# Patient Record
Sex: FEMALE | Race: WHITE | NOT HISPANIC OR LATINO
[De-identification: names, ages, dates, MRNs, and addresses within clinical notes are randomized per-mention and may not be internally consistent; named-entity substitution may affect disease eponyms.]

---

## 2017-04-27 ENCOUNTER — TRANSCRIPTION ENCOUNTER (OUTPATIENT)
Age: 27
End: 2017-04-27

## 2017-12-22 ENCOUNTER — OUTPATIENT (OUTPATIENT)
Dept: OUTPATIENT SERVICES | Facility: HOSPITAL | Age: 27
LOS: 1 days | End: 2017-12-22

## 2017-12-22 DIAGNOSIS — Z3A.00 WEEKS OF GESTATION OF PREGNANCY NOT SPECIFIED: ICD-10-CM

## 2017-12-22 DIAGNOSIS — O26.899 OTHER SPECIFIED PREGNANCY RELATED CONDITIONS, UNSPECIFIED TRIMESTER: ICD-10-CM

## 2018-03-30 ENCOUNTER — OUTPATIENT (OUTPATIENT)
Dept: OUTPATIENT SERVICES | Facility: HOSPITAL | Age: 28
LOS: 1 days | End: 2018-03-30

## 2018-03-30 ENCOUNTER — EMERGENCY (EMERGENCY)
Facility: HOSPITAL | Age: 28
LOS: 1 days | Discharge: STILL ADMITTED OR ADMINISTRATIVE | End: 2018-03-30
Attending: EMERGENCY MEDICINE | Admitting: EMERGENCY MEDICINE

## 2018-03-30 VITALS
DIASTOLIC BLOOD PRESSURE: 71 MMHG | OXYGEN SATURATION: 100 % | TEMPERATURE: 99 F | SYSTOLIC BLOOD PRESSURE: 105 MMHG | HEART RATE: 83 BPM | RESPIRATION RATE: 16 BRPM

## 2018-03-30 VITALS
DIASTOLIC BLOOD PRESSURE: 56 MMHG | SYSTOLIC BLOOD PRESSURE: 102 MMHG | TEMPERATURE: 98 F | RESPIRATION RATE: 17 BRPM | OXYGEN SATURATION: 100 % | HEART RATE: 80 BPM

## 2018-03-30 DIAGNOSIS — O26.899 OTHER SPECIFIED PREGNANCY RELATED CONDITIONS, UNSPECIFIED TRIMESTER: ICD-10-CM

## 2018-03-30 DIAGNOSIS — Z3A.00 WEEKS OF GESTATION OF PREGNANCY NOT SPECIFIED: ICD-10-CM

## 2018-03-30 LAB
ALBUMIN SERPL ELPH-MCNC: 3.3 G/DL — SIGNIFICANT CHANGE UP (ref 3.3–5)
ALP SERPL-CCNC: 84 U/L — SIGNIFICANT CHANGE UP (ref 40–120)
ALT FLD-CCNC: 16 U/L — SIGNIFICANT CHANGE UP (ref 4–33)
APPEARANCE UR: SIGNIFICANT CHANGE UP
AST SERPL-CCNC: 22 U/L — SIGNIFICANT CHANGE UP (ref 4–32)
B PERT DNA SPEC QL NAA+PROBE: SIGNIFICANT CHANGE UP
BACTERIA # UR AUTO: SIGNIFICANT CHANGE UP
BASE EXCESS BLDV CALC-SCNC: 0.7 MMOL/L — SIGNIFICANT CHANGE UP
BASOPHILS # BLD AUTO: 0.01 K/UL — SIGNIFICANT CHANGE UP (ref 0–0.2)
BASOPHILS NFR BLD AUTO: 0.1 % — SIGNIFICANT CHANGE UP (ref 0–2)
BILIRUB SERPL-MCNC: 0.3 MG/DL — SIGNIFICANT CHANGE UP (ref 0.2–1.2)
BILIRUB UR-MCNC: NEGATIVE — SIGNIFICANT CHANGE UP
BLOOD GAS VENOUS - CREATININE: 0.69 MG/DL — SIGNIFICANT CHANGE UP (ref 0.5–1.3)
BLOOD UR QL VISUAL: NEGATIVE — SIGNIFICANT CHANGE UP
BUN SERPL-MCNC: 7 MG/DL — SIGNIFICANT CHANGE UP (ref 7–23)
C PNEUM DNA SPEC QL NAA+PROBE: NOT DETECTED — SIGNIFICANT CHANGE UP
CALCIUM SERPL-MCNC: 8.3 MG/DL — LOW (ref 8.4–10.5)
CHLORIDE BLDV-SCNC: 106 MMOL/L — SIGNIFICANT CHANGE UP (ref 96–108)
CHLORIDE SERPL-SCNC: 103 MMOL/L — SIGNIFICANT CHANGE UP (ref 98–107)
CO2 SERPL-SCNC: 24 MMOL/L — SIGNIFICANT CHANGE UP (ref 22–31)
COLOR SPEC: YELLOW — SIGNIFICANT CHANGE UP
CREAT SERPL-MCNC: 0.67 MG/DL — SIGNIFICANT CHANGE UP (ref 0.5–1.3)
EOSINOPHIL # BLD AUTO: 0.04 K/UL — SIGNIFICANT CHANGE UP (ref 0–0.5)
EOSINOPHIL NFR BLD AUTO: 0.4 % — SIGNIFICANT CHANGE UP (ref 0–6)
FLUAV H1 2009 PAND RNA SPEC QL NAA+PROBE: NOT DETECTED — SIGNIFICANT CHANGE UP
FLUAV H1 RNA SPEC QL NAA+PROBE: NOT DETECTED — SIGNIFICANT CHANGE UP
FLUAV H3 RNA SPEC QL NAA+PROBE: NOT DETECTED — SIGNIFICANT CHANGE UP
FLUAV SUBTYP SPEC NAA+PROBE: SIGNIFICANT CHANGE UP
FLUBV RNA SPEC QL NAA+PROBE: NOT DETECTED — SIGNIFICANT CHANGE UP
GAS PNL BLDV: 136 MMOL/L — SIGNIFICANT CHANGE UP (ref 136–146)
GLUCOSE BLDV-MCNC: 85 — SIGNIFICANT CHANGE UP (ref 70–99)
GLUCOSE SERPL-MCNC: 87 MG/DL — SIGNIFICANT CHANGE UP (ref 70–99)
GLUCOSE UR-MCNC: NEGATIVE — SIGNIFICANT CHANGE UP
HADV DNA SPEC QL NAA+PROBE: NOT DETECTED — SIGNIFICANT CHANGE UP
HCO3 BLDV-SCNC: 24 MMOL/L — SIGNIFICANT CHANGE UP (ref 20–27)
HCOV 229E RNA SPEC QL NAA+PROBE: NOT DETECTED — SIGNIFICANT CHANGE UP
HCOV HKU1 RNA SPEC QL NAA+PROBE: NOT DETECTED — SIGNIFICANT CHANGE UP
HCOV NL63 RNA SPEC QL NAA+PROBE: NOT DETECTED — SIGNIFICANT CHANGE UP
HCOV OC43 RNA SPEC QL NAA+PROBE: NOT DETECTED — SIGNIFICANT CHANGE UP
HCT VFR BLD CALC: 33 % — LOW (ref 34.5–45)
HCT VFR BLDV CALC: 33.3 % — LOW (ref 34.5–45)
HGB BLD-MCNC: 10.6 G/DL — LOW (ref 11.5–15.5)
HGB BLDV-MCNC: 10.8 G/DL — LOW (ref 11.5–15.5)
HMPV RNA SPEC QL NAA+PROBE: NOT DETECTED — SIGNIFICANT CHANGE UP
HPIV1 RNA SPEC QL NAA+PROBE: NOT DETECTED — SIGNIFICANT CHANGE UP
HPIV2 RNA SPEC QL NAA+PROBE: NOT DETECTED — SIGNIFICANT CHANGE UP
HPIV3 RNA SPEC QL NAA+PROBE: NOT DETECTED — SIGNIFICANT CHANGE UP
HPIV4 RNA SPEC QL NAA+PROBE: NOT DETECTED — SIGNIFICANT CHANGE UP
IMM GRANULOCYTES # BLD AUTO: 0.04 # — SIGNIFICANT CHANGE UP
IMM GRANULOCYTES NFR BLD AUTO: 0.4 % — SIGNIFICANT CHANGE UP (ref 0–1.5)
KETONES UR-MCNC: NEGATIVE — SIGNIFICANT CHANGE UP
LACTATE BLDV-MCNC: 0.9 MMOL/L — SIGNIFICANT CHANGE UP (ref 0.5–2)
LEUKOCYTE ESTERASE UR-ACNC: HIGH
LIDOCAIN IGE QN: 30.6 U/L — SIGNIFICANT CHANGE UP (ref 7–60)
LYMPHOCYTES # BLD AUTO: 0.89 K/UL — LOW (ref 1–3.3)
LYMPHOCYTES # BLD AUTO: 9.6 % — LOW (ref 13–44)
M PNEUMO DNA SPEC QL NAA+PROBE: NOT DETECTED — SIGNIFICANT CHANGE UP
MCHC RBC-ENTMCNC: 29.4 PG — SIGNIFICANT CHANGE UP (ref 27–34)
MCHC RBC-ENTMCNC: 32.1 % — SIGNIFICANT CHANGE UP (ref 32–36)
MCV RBC AUTO: 91.4 FL — SIGNIFICANT CHANGE UP (ref 80–100)
MONOCYTES # BLD AUTO: 0.65 K/UL — SIGNIFICANT CHANGE UP (ref 0–0.9)
MONOCYTES NFR BLD AUTO: 7 % — SIGNIFICANT CHANGE UP (ref 2–14)
NEUTROPHILS # BLD AUTO: 7.61 K/UL — HIGH (ref 1.8–7.4)
NEUTROPHILS NFR BLD AUTO: 82.5 % — HIGH (ref 43–77)
NITRITE UR-MCNC: NEGATIVE — SIGNIFICANT CHANGE UP
NRBC # FLD: 0 — SIGNIFICANT CHANGE UP
PCO2 BLDV: 41 MMHG — SIGNIFICANT CHANGE UP (ref 41–51)
PH BLDV: 7.4 PH — SIGNIFICANT CHANGE UP (ref 7.32–7.43)
PH UR: 6.5 — SIGNIFICANT CHANGE UP (ref 4.6–8)
PLATELET # BLD AUTO: 179 K/UL — SIGNIFICANT CHANGE UP (ref 150–400)
PMV BLD: 11.2 FL — SIGNIFICANT CHANGE UP (ref 7–13)
PO2 BLDV: 28 MMHG — LOW (ref 35–40)
POTASSIUM BLDV-SCNC: 3.5 MMOL/L — SIGNIFICANT CHANGE UP (ref 3.4–4.5)
POTASSIUM SERPL-MCNC: 3.5 MMOL/L — SIGNIFICANT CHANGE UP (ref 3.5–5.3)
POTASSIUM SERPL-SCNC: 3.5 MMOL/L — SIGNIFICANT CHANGE UP (ref 3.5–5.3)
PROT SERPL-MCNC: 6.4 G/DL — SIGNIFICANT CHANGE UP (ref 6–8.3)
PROT UR-MCNC: 20 MG/DL — SIGNIFICANT CHANGE UP
RBC # BLD: 3.61 M/UL — LOW (ref 3.8–5.2)
RBC # FLD: 11.9 % — SIGNIFICANT CHANGE UP (ref 10.3–14.5)
RBC CASTS # UR COMP ASSIST: SIGNIFICANT CHANGE UP (ref 0–?)
RSV RNA SPEC QL NAA+PROBE: NOT DETECTED — SIGNIFICANT CHANGE UP
RV+EV RNA SPEC QL NAA+PROBE: NOT DETECTED — SIGNIFICANT CHANGE UP
SAO2 % BLDV: 46.6 % — LOW (ref 60–85)
SODIUM SERPL-SCNC: 138 MMOL/L — SIGNIFICANT CHANGE UP (ref 135–145)
SP GR SPEC: 1.01 — SIGNIFICANT CHANGE UP (ref 1–1.04)
SQUAMOUS # UR AUTO: SIGNIFICANT CHANGE UP
UROBILINOGEN FLD QL: NORMAL MG/DL — SIGNIFICANT CHANGE UP
WBC # BLD: 9.24 K/UL — SIGNIFICANT CHANGE UP (ref 3.8–10.5)
WBC # FLD AUTO: 9.24 K/UL — SIGNIFICANT CHANGE UP (ref 3.8–10.5)
WBC UR QL: HIGH (ref 0–?)

## 2018-03-30 RX ORDER — SODIUM CHLORIDE 9 MG/ML
1000 INJECTION INTRAMUSCULAR; INTRAVENOUS; SUBCUTANEOUS ONCE
Qty: 0 | Refills: 0 | Status: COMPLETED | OUTPATIENT
Start: 2018-03-30 | End: 2018-03-30

## 2018-03-30 RX ORDER — CEPHALEXIN 500 MG
1 CAPSULE ORAL
Qty: 28 | Refills: 0 | OUTPATIENT
Start: 2018-03-30 | End: 2018-04-05

## 2018-03-30 RX ADMIN — SODIUM CHLORIDE 1000 MILLILITER(S): 9 INJECTION INTRAMUSCULAR; INTRAVENOUS; SUBCUTANEOUS at 08:33

## 2018-03-30 NOTE — ED ADULT NURSE NOTE - OBJECTIVE STATEMENT
Patient is 33 weeks pregnant , c/o fevers, general malaise, morning nausea, and episode of vomiting today. States recently treated for URI last week given azithromycin took 4/5 doses states vomited the last dose. Currently appearing in NAD. Denies vaginal bleeding states has some cramping.

## 2018-03-30 NOTE — ED PROVIDER NOTE - OBJECTIVE STATEMENT
28yo F with no PMH presenting with sudden onset chills with vomiting episode waking her from sleep 2 hours ago. When she awoke she felt like she had She checked her temperature, and it was 100degF orally, so she took Tylenol 500mg x 1. She had similar symptoms 2 days ago with no recurrence until last night; 2 days ago she had vomiting x 4 episodes with temperature 103degF. Her gynecologist told her that if her fevers persisted, she would need to go to the ED. Also of note, 1.5 weeks ago she was treated for URI with azithromycin x 4 days (she has penicillin allergy) with subsequent resolution of sore throat and ear pain. 26yo F with no PMH presenting with sudden onset chills with vomiting episode waking her from sleep 2 hours ago. When she awoke she felt like she had She checked her temperature, and it was 100degF orally, so she took Tylenol 500mg x 1. She had similar symptoms 2 days ago with no recurrence until last night; 2 days ago she had vomiting x 4 episodes with temperature 103degF. She was diagnosed with UTI by her PMD at this time 2 days ago. Her gynecologist told her that if her fevers persisted, she would need to go to the ED because he was out of options for abx. Also of note, 1.5 weeks ago she was treated for URI with azithromycin x 4 days (she has penicillin allergy) with subsequent resolution of sore throat and ear pain. 26yo F with no PMH presenting with sudden onset chills with vomiting episode waking her from sleep 2 hours ago. When she awoke she felt like she had She checked her temperature, and it was 100degF orally, so she took Tylenol 500mg x 1. She had similar symptoms 2 days ago with no recurrence until last night; 2 days ago she had vomiting x 4 episodes with temperature 103degF. She was diagnosed with UTI by her PMD at this time 2 days ago. Her gynecologist told her that if her fevers persisted, she would need to go to the ED because he was out of options for abx. Also of note, 1.5 weeks ago she was treated for URI with azithromycin x 4 days (she has penicillin allergy) with subsequent resolution of sore throat and ear pain. Pt reports having occasional lower abdominal cramping, beginning 2 weeks ago.

## 2018-03-30 NOTE — ED ADULT TRIAGE NOTE - CHIEF COMPLAINT QUOTE
pt. 33 week pregnant c/o subjective fevers and chills. Pt. states she took 500 mg of tylenol for her fever. Pt. denies any abdominal pain , states her baby is actively moving. L&D triage aware of pt. , needs to be medically evaluated.

## 2018-03-30 NOTE — ED PROVIDER NOTE - PROGRESS NOTE DETAILS
Discussed case with Dr. Deysi Duarte; she sent U/A 2 days ago but does not have available. She is interested in Pt well appearing, called [phone] NP at triage as pt with lower abd cramps, no symptoms except dysuria, and cramps, no cough, n/v/d, last fever was 103 2 days ago.  Highest today was 99.  Recent abx 1 week ago Azithro for URI, but no cough, sore throat now. Back pain throughout 3rd trimester.  Spoke with NP and agree for baby evaluation on L+D and they will followup with labs

## 2018-03-30 NOTE — ED PROVIDER NOTE - MEDICAL DECISION MAKING DETAILS
26yo F with no PMH presenting with sudden onset chills with vomiting episode waking her from sleep 2 hours ago, will check basic labs and fetal heartrate; suspect viral gastroenteritis at this time pending labs. 26yo F with no PMH presenting with sudden onset chills with vomiting episode waking her from sleep 2 hours ago, will check basic labs and fetal heartrate; suspect UTI vs gastroenteritis at this time pending labs.

## 2018-03-31 LAB
BACTERIA UR CULT: SIGNIFICANT CHANGE UP
SPECIMEN SOURCE: SIGNIFICANT CHANGE UP

## 2018-05-15 ENCOUNTER — INPATIENT (INPATIENT)
Facility: HOSPITAL | Age: 28
LOS: 1 days | Discharge: HOME | End: 2018-05-17
Attending: SPECIALIST | Admitting: SPECIALIST

## 2018-05-15 ENCOUNTER — TRANSCRIPTION ENCOUNTER (OUTPATIENT)
Age: 28
End: 2018-05-15

## 2018-05-15 VITALS — WEIGHT: 130.07 LBS | HEIGHT: 66 IN

## 2018-05-15 DIAGNOSIS — O26.899 OTHER SPECIFIED PREGNANCY RELATED CONDITIONS, UNSPECIFIED TRIMESTER: ICD-10-CM

## 2018-05-15 DIAGNOSIS — Z3A.00 WEEKS OF GESTATION OF PREGNANCY NOT SPECIFIED: ICD-10-CM

## 2018-05-15 LAB
BASOPHILS # BLD AUTO: 0.03 K/UL — SIGNIFICANT CHANGE UP (ref 0–0.2)
BASOPHILS NFR BLD AUTO: 0.2 % — SIGNIFICANT CHANGE UP (ref 0–2)
BLD GP AB SCN SERPL QL: NEGATIVE — SIGNIFICANT CHANGE UP
EOSINOPHIL # BLD AUTO: 0.01 K/UL — SIGNIFICANT CHANGE UP (ref 0–0.5)
EOSINOPHIL NFR BLD AUTO: 0.1 % — SIGNIFICANT CHANGE UP (ref 0–6)
HCT VFR BLD CALC: 35.7 % — SIGNIFICANT CHANGE UP (ref 34.5–45)
HGB BLD-MCNC: 11.8 G/DL — SIGNIFICANT CHANGE UP (ref 11.5–15.5)
IMM GRANULOCYTES # BLD AUTO: 0.08 # — SIGNIFICANT CHANGE UP
IMM GRANULOCYTES NFR BLD AUTO: 0.5 % — SIGNIFICANT CHANGE UP (ref 0–1.5)
LYMPHOCYTES # BLD AUTO: 1.72 K/UL — SIGNIFICANT CHANGE UP (ref 1–3.3)
LYMPHOCYTES # BLD AUTO: 10.8 % — LOW (ref 13–44)
MCHC RBC-ENTMCNC: 29.8 PG — SIGNIFICANT CHANGE UP (ref 27–34)
MCHC RBC-ENTMCNC: 33.1 % — SIGNIFICANT CHANGE UP (ref 32–36)
MCV RBC AUTO: 90.2 FL — SIGNIFICANT CHANGE UP (ref 80–100)
MONOCYTES # BLD AUTO: 0.81 K/UL — SIGNIFICANT CHANGE UP (ref 0–0.9)
MONOCYTES NFR BLD AUTO: 5.1 % — SIGNIFICANT CHANGE UP (ref 2–14)
NEUTROPHILS # BLD AUTO: 13.32 K/UL — HIGH (ref 1.8–7.4)
NEUTROPHILS NFR BLD AUTO: 83.3 % — HIGH (ref 43–77)
NRBC # FLD: 0 — SIGNIFICANT CHANGE UP
PLATELET # BLD AUTO: 152 K/UL — SIGNIFICANT CHANGE UP (ref 150–400)
PMV BLD: 12.5 FL — SIGNIFICANT CHANGE UP (ref 7–13)
RBC # BLD: 3.96 M/UL — SIGNIFICANT CHANGE UP (ref 3.8–5.2)
RBC # FLD: 13.2 % — SIGNIFICANT CHANGE UP (ref 10.3–14.5)
RH IG SCN BLD-IMP: POSITIVE — SIGNIFICANT CHANGE UP
RH IG SCN BLD-IMP: POSITIVE — SIGNIFICANT CHANGE UP
WBC # BLD: 15.97 K/UL — HIGH (ref 3.8–10.5)
WBC # FLD AUTO: 15.97 K/UL — HIGH (ref 3.8–10.5)

## 2018-05-15 RX ORDER — ACETAMINOPHEN 500 MG
975 TABLET ORAL EVERY 6 HOURS
Qty: 0 | Refills: 0 | Status: DISCONTINUED | OUTPATIENT
Start: 2018-05-15 | End: 2018-05-17

## 2018-05-15 RX ORDER — DOCUSATE SODIUM 100 MG
100 CAPSULE ORAL
Qty: 0 | Refills: 0 | Status: DISCONTINUED | OUTPATIENT
Start: 2018-05-15 | End: 2018-05-17

## 2018-05-15 RX ORDER — IBUPROFEN 200 MG
600 TABLET ORAL EVERY 6 HOURS
Qty: 0 | Refills: 0 | Status: DISCONTINUED | OUTPATIENT
Start: 2018-05-15 | End: 2018-05-17

## 2018-05-15 RX ORDER — OXYCODONE HYDROCHLORIDE 5 MG/1
5 TABLET ORAL
Qty: 0 | Refills: 0 | Status: DISCONTINUED | OUTPATIENT
Start: 2018-05-15 | End: 2018-05-17

## 2018-05-15 RX ORDER — OXYTOCIN 10 UNIT/ML
333.33 VIAL (ML) INJECTION
Qty: 20 | Refills: 0 | Status: COMPLETED | OUTPATIENT
Start: 2018-05-15

## 2018-05-15 RX ORDER — OXYTOCIN 10 UNIT/ML
41.67 VIAL (ML) INJECTION
Qty: 20 | Refills: 0 | Status: DISCONTINUED | OUTPATIENT
Start: 2018-05-15 | End: 2018-05-15

## 2018-05-15 RX ORDER — HYDROCORTISONE 1 %
1 OINTMENT (GRAM) TOPICAL EVERY 4 HOURS
Qty: 0 | Refills: 0 | Status: DISCONTINUED | OUTPATIENT
Start: 2018-05-15 | End: 2018-05-16

## 2018-05-15 RX ORDER — CITRIC ACID/SODIUM CITRATE 300-500 MG
15 SOLUTION, ORAL ORAL EVERY 4 HOURS
Qty: 0 | Refills: 0 | Status: DISCONTINUED | OUTPATIENT
Start: 2018-05-15 | End: 2018-05-15

## 2018-05-15 RX ORDER — AER TRAVELER 0.5 G/1
1 SOLUTION RECTAL; TOPICAL EVERY 4 HOURS
Qty: 0 | Refills: 0 | Status: DISCONTINUED | OUTPATIENT
Start: 2018-05-15 | End: 2018-05-17

## 2018-05-15 RX ORDER — TETANUS TOXOID, REDUCED DIPHTHERIA TOXOID AND ACELLULAR PERTUSSIS VACCINE, ADSORBED 5; 2.5; 8; 8; 2.5 [IU]/.5ML; [IU]/.5ML; UG/.5ML; UG/.5ML; UG/.5ML
0.5 SUSPENSION INTRAMUSCULAR ONCE
Qty: 0 | Refills: 0 | Status: DISCONTINUED | OUTPATIENT
Start: 2018-05-15 | End: 2018-05-17

## 2018-05-15 RX ORDER — SIMETHICONE 80 MG/1
80 TABLET, CHEWABLE ORAL EVERY 6 HOURS
Qty: 0 | Refills: 0 | Status: DISCONTINUED | OUTPATIENT
Start: 2018-05-15 | End: 2018-05-17

## 2018-05-15 RX ORDER — PRAMOXINE HYDROCHLORIDE 150 MG/15G
1 AEROSOL, FOAM RECTAL EVERY 4 HOURS
Qty: 0 | Refills: 0 | Status: DISCONTINUED | OUTPATIENT
Start: 2018-05-15 | End: 2018-05-15

## 2018-05-15 RX ORDER — IBUPROFEN 200 MG
600 TABLET ORAL EVERY 6 HOURS
Qty: 0 | Refills: 0 | Status: DISCONTINUED | OUTPATIENT
Start: 2018-05-15 | End: 2018-05-15

## 2018-05-15 RX ORDER — GLYCERIN ADULT
1 SUPPOSITORY, RECTAL RECTAL AT BEDTIME
Qty: 0 | Refills: 0 | Status: DISCONTINUED | OUTPATIENT
Start: 2018-05-15 | End: 2018-05-17

## 2018-05-15 RX ORDER — OXYCODONE AND ACETAMINOPHEN 5; 325 MG/1; MG/1
2 TABLET ORAL EVERY 6 HOURS
Qty: 0 | Refills: 0 | Status: DISCONTINUED | OUTPATIENT
Start: 2018-05-15 | End: 2018-05-15

## 2018-05-15 RX ORDER — LANOLIN
1 OINTMENT (GRAM) TOPICAL EVERY 6 HOURS
Qty: 0 | Refills: 0 | Status: DISCONTINUED | OUTPATIENT
Start: 2018-05-15 | End: 2018-05-17

## 2018-05-15 RX ORDER — SODIUM CHLORIDE 9 MG/ML
1000 INJECTION, SOLUTION INTRAVENOUS ONCE
Qty: 0 | Refills: 0 | Status: COMPLETED | OUTPATIENT
Start: 2018-05-15 | End: 2018-05-15

## 2018-05-15 RX ORDER — IBUPROFEN 200 MG
600 TABLET ORAL EVERY 6 HOURS
Qty: 0 | Refills: 0 | Status: COMPLETED | OUTPATIENT
Start: 2018-05-15 | End: 2019-04-13

## 2018-05-15 RX ORDER — DIBUCAINE 1 %
1 OINTMENT (GRAM) RECTAL EVERY 4 HOURS
Qty: 0 | Refills: 0 | Status: DISCONTINUED | OUTPATIENT
Start: 2018-05-15 | End: 2018-05-17

## 2018-05-15 RX ORDER — SODIUM CHLORIDE 9 MG/ML
3 INJECTION INTRAMUSCULAR; INTRAVENOUS; SUBCUTANEOUS EVERY 8 HOURS
Qty: 0 | Refills: 0 | Status: DISCONTINUED | OUTPATIENT
Start: 2018-05-15 | End: 2018-05-17

## 2018-05-15 RX ORDER — OXYTOCIN 10 UNIT/ML
333.33 VIAL (ML) INJECTION
Qty: 20 | Refills: 0 | Status: COMPLETED | OUTPATIENT
Start: 2018-05-15 | End: 2018-05-15

## 2018-05-15 RX ORDER — PRAMOXINE HYDROCHLORIDE 150 MG/15G
1 AEROSOL, FOAM RECTAL EVERY 4 HOURS
Qty: 0 | Refills: 0 | Status: DISCONTINUED | OUTPATIENT
Start: 2018-05-15 | End: 2018-05-16

## 2018-05-15 RX ORDER — SODIUM CHLORIDE 9 MG/ML
3 INJECTION INTRAMUSCULAR; INTRAVENOUS; SUBCUTANEOUS EVERY 8 HOURS
Qty: 0 | Refills: 0 | Status: DISCONTINUED | OUTPATIENT
Start: 2018-05-15 | End: 2018-05-15

## 2018-05-15 RX ORDER — OXYTOCIN 10 UNIT/ML
41.67 VIAL (ML) INJECTION
Qty: 20 | Refills: 0 | Status: DISCONTINUED | OUTPATIENT
Start: 2018-05-15 | End: 2018-05-16

## 2018-05-15 RX ORDER — SODIUM CHLORIDE 9 MG/ML
1000 INJECTION, SOLUTION INTRAVENOUS
Qty: 0 | Refills: 0 | Status: DISCONTINUED | OUTPATIENT
Start: 2018-05-15 | End: 2018-05-15

## 2018-05-15 RX ORDER — OXYCODONE HYDROCHLORIDE 5 MG/1
5 TABLET ORAL EVERY 4 HOURS
Qty: 0 | Refills: 0 | Status: DISCONTINUED | OUTPATIENT
Start: 2018-05-15 | End: 2018-05-17

## 2018-05-15 RX ORDER — KETOROLAC TROMETHAMINE 30 MG/ML
30 SYRINGE (ML) INJECTION ONCE
Qty: 0 | Refills: 0 | Status: DISCONTINUED | OUTPATIENT
Start: 2018-05-15 | End: 2018-05-16

## 2018-05-15 RX ORDER — MAGNESIUM HYDROXIDE 400 MG/1
30 TABLET, CHEWABLE ORAL
Qty: 0 | Refills: 0 | Status: DISCONTINUED | OUTPATIENT
Start: 2018-05-15 | End: 2018-05-17

## 2018-05-15 RX ORDER — HYDROCORTISONE 1 %
1 OINTMENT (GRAM) TOPICAL EVERY 4 HOURS
Qty: 0 | Refills: 0 | Status: DISCONTINUED | OUTPATIENT
Start: 2018-05-15 | End: 2018-05-15

## 2018-05-15 RX ORDER — DIPHENHYDRAMINE HCL 50 MG
25 CAPSULE ORAL EVERY 6 HOURS
Qty: 0 | Refills: 0 | Status: DISCONTINUED | OUTPATIENT
Start: 2018-05-15 | End: 2018-05-17

## 2018-05-15 RX ORDER — ACETAMINOPHEN 500 MG
650 TABLET ORAL EVERY 6 HOURS
Qty: 0 | Refills: 0 | Status: DISCONTINUED | OUTPATIENT
Start: 2018-05-15 | End: 2018-05-15

## 2018-05-15 RX ORDER — ACETAMINOPHEN 500 MG
975 TABLET ORAL EVERY 6 HOURS
Qty: 0 | Refills: 0 | Status: COMPLETED | OUTPATIENT
Start: 2018-05-15 | End: 2019-04-13

## 2018-05-15 RX ORDER — DIBUCAINE 1 %
1 OINTMENT (GRAM) RECTAL EVERY 4 HOURS
Qty: 0 | Refills: 0 | Status: DISCONTINUED | OUTPATIENT
Start: 2018-05-15 | End: 2018-05-15

## 2018-05-15 RX ORDER — AER TRAVELER 0.5 G/1
1 SOLUTION RECTAL; TOPICAL EVERY 4 HOURS
Qty: 0 | Refills: 0 | Status: DISCONTINUED | OUTPATIENT
Start: 2018-05-15 | End: 2018-05-15

## 2018-05-15 RX ADMIN — SODIUM CHLORIDE 3 MILLILITER(S): 9 INJECTION INTRAMUSCULAR; INTRAVENOUS; SUBCUTANEOUS at 22:02

## 2018-05-15 RX ADMIN — SODIUM CHLORIDE 250 MILLILITER(S): 9 INJECTION, SOLUTION INTRAVENOUS at 13:03

## 2018-05-15 RX ADMIN — Medication 600 MILLIGRAM(S): at 22:00

## 2018-05-15 RX ADMIN — SODIUM CHLORIDE 2000 MILLILITER(S): 9 INJECTION, SOLUTION INTRAVENOUS at 12:10

## 2018-05-15 RX ADMIN — Medication 125 MILLIUNIT(S)/MIN: at 15:50

## 2018-05-15 RX ADMIN — Medication 999.99 MILLIUNIT(S)/MIN: at 15:50

## 2018-05-15 RX ADMIN — Medication 600 MILLIGRAM(S): at 21:30

## 2018-05-15 NOTE — DISCHARGE NOTE OB - MEDICATION SUMMARY - MEDICATIONS TO STOP TAKING
I will STOP taking the medications listed below when I get home from the hospital:    cephalexin 500 mg oral tablet  -- 1 tab(s) by mouth every 6 hours   -- Finish all this medication unless otherwise directed by prescriber.

## 2018-05-15 NOTE — DISCHARGE NOTE OB - MATERIALS PROVIDED
Discharge Medication Information for Patients and Families Pocket Guide/Breastfeeding Mother’s Support Group Information/Guide to Postpartum Care/[facility] Hearing Screen Program/Back To Sleep Handout/Breastfeeding Guide and Packet/Birth Certificate Instructions/  Immunization Record/Breastfeeding Log/Shaken Baby Prevention Handout/[facility] [place] Screening Program

## 2018-05-15 NOTE — DISCHARGE NOTE OB - CARE PROVIDER_API CALL
Manuel Christy (MD), Obstetrics and Gynecology  [address] [facility]  [address] [place] [address]  Phone: [phone]  Fax: [phone]

## 2018-05-15 NOTE — DISCHARGE NOTE OB - PATIENT PORTAL LINK FT
You can access the [facility] Portal, offered by [facility], by registering with the following website: [email or web address]

## 2018-05-16 LAB — T PALLIDUM AB TITR SER: NEGATIVE — SIGNIFICANT CHANGE UP

## 2018-05-16 RX ORDER — PRAMOXINE HYDROCHLORIDE 150 MG/15G
1 AEROSOL, FOAM RECTAL EVERY 4 HOURS
Qty: 0 | Refills: 0 | Status: DISCONTINUED | OUTPATIENT
Start: 2018-05-16 | End: 2018-05-17

## 2018-05-16 RX ORDER — HYDROCORTISONE 1 %
1 OINTMENT (GRAM) TOPICAL EVERY 4 HOURS
Qty: 0 | Refills: 0 | Status: DISCONTINUED | OUTPATIENT
Start: 2018-05-16 | End: 2018-05-17

## 2018-05-16 RX ADMIN — Medication 1 TABLET(S): at 11:30

## 2018-05-16 RX ADMIN — Medication 600 MILLIGRAM(S): at 21:00

## 2018-05-16 RX ADMIN — SODIUM CHLORIDE 3 MILLILITER(S): 9 INJECTION INTRAMUSCULAR; INTRAVENOUS; SUBCUTANEOUS at 06:45

## 2018-05-16 RX ADMIN — Medication 975 MILLIGRAM(S): at 03:00

## 2018-05-16 RX ADMIN — Medication 975 MILLIGRAM(S): at 03:30

## 2018-05-16 RX ADMIN — Medication 975 MILLIGRAM(S): at 12:30

## 2018-05-16 RX ADMIN — Medication 975 MILLIGRAM(S): at 11:30

## 2018-05-16 RX ADMIN — SODIUM CHLORIDE 3 MILLILITER(S): 9 INJECTION INTRAMUSCULAR; INTRAVENOUS; SUBCUTANEOUS at 14:12

## 2018-05-16 RX ADMIN — Medication 600 MILLIGRAM(S): at 22:00

## 2018-05-16 NOTE — PROGRESS NOTE ADULT - SUBJECTIVE AND OBJECTIVE BOX
S: Patient doing well.     Pain controlled.  +OOB.  +void.    Tolerating PO.  Lochia WNL.    O: Vital Signs Last 24 Hrs  T(C): 36.6 (16 May 2018 05:48), Max: 37.7 (15 May 2018 17:28)  T(F): 97.8 (16 May 2018 05:48), Max: 99.8 (15 May 2018 17:28)  HR: 63 (16 May 2018 05:48) (61 - 75)  BP: 102/52 (16 May 2018 05:48) (97/58 - 116/61)  BP(mean): --  RR: 18 (16 May 2018 05:48) (18 - 18)  SpO2: 99% (16 May 2018 05:48) (98% - 100%)      Pt without complaints  vital signs stable  Abdomen soft  fundus firm, non tender  extremities non tender  lochia wnl    Patient doing well  Routine post partum care    Labs:                        11.8   15.97 )-----------( 152      ( 15 May 2018 12:30 )             35.7       ABO Interpretation: A (05-15 @ 12:29)    Rh Interpretation: Positive (05-15 @ 12:29)    Antibody Screen Negative      A: 27y s/p  doing well.   -Continue routine postpartum care.  -Discharge planned for tomorrow

## 2018-05-16 NOTE — PROGRESS NOTE ADULT - SUBJECTIVE AND OBJECTIVE BOX
Anesthesia Post-op Note    POD#1 S/P     Patient is doing well.  OOBAA.  Pain is tolerable.  No complaints of Headache. Pt reports able to void.  No residual anesthetic issues or complications noted.    T(C): 36.6 (18 @ 05:48), Max: 37.7 (05-15-18 @ 17:28)  HR: 63 (18 @ 05:48) (61 - 75)  BP: 102/52 (18 @ 05:48) (97/58 - 116/61)  RR: 18 (18 @ 05:48) (18 - 18)  SpO2: 99% (18 @ 05:48) (98% - 100%)

## 2018-05-17 VITALS
DIASTOLIC BLOOD PRESSURE: 57 MMHG | HEART RATE: 65 BPM | TEMPERATURE: 98 F | RESPIRATION RATE: 18 BRPM | SYSTOLIC BLOOD PRESSURE: 99 MMHG | OXYGEN SATURATION: 100 %

## 2018-05-17 RX ORDER — IBUPROFEN 200 MG
1 TABLET ORAL
Qty: 0 | Refills: 0 | DISCHARGE
Start: 2018-05-17

## 2018-05-17 RX ORDER — ACETAMINOPHEN 500 MG
3 TABLET ORAL
Qty: 0 | Refills: 0 | DISCHARGE
Start: 2018-05-17

## 2018-05-17 RX ADMIN — Medication 600 MILLIGRAM(S): at 07:45

## 2018-05-17 RX ADMIN — Medication 600 MILLIGRAM(S): at 06:51

## 2019-02-11 ENCOUNTER — RESULT REVIEW (OUTPATIENT)
Age: 29
End: 2019-02-11

## 2019-05-21 ENCOUNTER — RESULT REVIEW (OUTPATIENT)
Age: 29
End: 2019-05-21

## 2019-10-21 PROBLEM — Z00.00 ENCOUNTER FOR PREVENTIVE HEALTH EXAMINATION: Status: ACTIVE | Noted: 2019-10-21

## 2019-10-23 ENCOUNTER — APPOINTMENT (OUTPATIENT)
Dept: ANTEPARTUM | Facility: CLINIC | Age: 29
End: 2019-10-23
Payer: COMMERCIAL

## 2019-10-23 ENCOUNTER — ASOB RESULT (OUTPATIENT)
Age: 29
End: 2019-10-23

## 2019-10-23 PROCEDURE — 76811 OB US DETAILED SNGL FETUS: CPT

## 2019-10-23 PROCEDURE — 76817 TRANSVAGINAL US OBSTETRIC: CPT | Mod: 59

## 2019-10-23 PROCEDURE — 99201 OFFICE OUTPATIENT NEW 10 MINUTES: CPT | Mod: 25

## 2019-10-31 ENCOUNTER — ASOB RESULT (OUTPATIENT)
Age: 29
End: 2019-10-31

## 2019-10-31 ENCOUNTER — APPOINTMENT (OUTPATIENT)
Dept: ANTEPARTUM | Facility: CLINIC | Age: 29
End: 2019-10-31
Payer: COMMERCIAL

## 2019-10-31 PROCEDURE — 76817 TRANSVAGINAL US OBSTETRIC: CPT

## 2019-10-31 PROCEDURE — 76816 OB US FOLLOW-UP PER FETUS: CPT

## 2019-11-14 ENCOUNTER — APPOINTMENT (OUTPATIENT)
Dept: ANTEPARTUM | Facility: CLINIC | Age: 29
End: 2019-11-14

## 2020-01-27 ENCOUNTER — ASOB RESULT (OUTPATIENT)
Age: 30
End: 2020-01-27

## 2020-01-27 ENCOUNTER — APPOINTMENT (OUTPATIENT)
Dept: ANTEPARTUM | Facility: CLINIC | Age: 30
End: 2020-01-27
Payer: COMMERCIAL

## 2020-01-27 PROCEDURE — 76820 UMBILICAL ARTERY ECHO: CPT

## 2020-01-27 PROCEDURE — 76816 OB US FOLLOW-UP PER FETUS: CPT

## 2020-01-27 PROCEDURE — 76819 FETAL BIOPHYS PROFIL W/O NST: CPT

## 2020-02-03 ENCOUNTER — APPOINTMENT (OUTPATIENT)
Dept: ANTEPARTUM | Facility: CLINIC | Age: 30
End: 2020-02-03
Payer: COMMERCIAL

## 2020-02-03 ENCOUNTER — ASOB RESULT (OUTPATIENT)
Age: 30
End: 2020-02-03

## 2020-02-03 PROCEDURE — 76819 FETAL BIOPHYS PROFIL W/O NST: CPT

## 2020-02-03 PROCEDURE — 76820 UMBILICAL ARTERY ECHO: CPT

## 2020-02-10 ENCOUNTER — ASOB RESULT (OUTPATIENT)
Age: 30
End: 2020-02-10

## 2020-02-10 ENCOUNTER — OUTPATIENT (OUTPATIENT)
Dept: OUTPATIENT SERVICES | Facility: HOSPITAL | Age: 30
LOS: 1 days | End: 2020-02-10
Payer: COMMERCIAL

## 2020-02-10 ENCOUNTER — APPOINTMENT (OUTPATIENT)
Dept: ANTEPARTUM | Facility: CLINIC | Age: 30
End: 2020-02-10
Payer: COMMERCIAL

## 2020-02-10 DIAGNOSIS — O09.293 SUPERVISION OF PREGNANCY WITH OTHER POOR REPRODUCTIVE OR OBSTETRIC HISTORY, THIRD TRIMESTER: ICD-10-CM

## 2020-02-10 DIAGNOSIS — O36.5930 MATERNAL CARE FOR OTHER KNOWN OR SUSPECTED POOR FETAL GROWTH, THIRD TRIMESTER, NOT APPLICABLE OR UNSPECIFIED: ICD-10-CM

## 2020-02-10 PROCEDURE — 76820 UMBILICAL ARTERY ECHO: CPT

## 2020-02-10 PROCEDURE — 76820 UMBILICAL ARTERY ECHO: CPT | Mod: 26

## 2020-02-10 PROCEDURE — 76818 FETAL BIOPHYS PROFILE W/NST: CPT | Mod: 26

## 2020-02-10 PROCEDURE — 76818 FETAL BIOPHYS PROFILE W/NST: CPT

## 2020-02-13 ENCOUNTER — APPOINTMENT (OUTPATIENT)
Dept: ANTEPARTUM | Facility: CLINIC | Age: 30
End: 2020-02-13

## 2020-02-18 ENCOUNTER — OUTPATIENT (OUTPATIENT)
Dept: OUTPATIENT SERVICES | Facility: HOSPITAL | Age: 30
LOS: 1 days | End: 2020-02-18
Payer: COMMERCIAL

## 2020-02-18 ENCOUNTER — APPOINTMENT (OUTPATIENT)
Dept: ANTEPARTUM | Facility: CLINIC | Age: 30
End: 2020-02-18
Payer: COMMERCIAL

## 2020-02-18 ENCOUNTER — ASOB RESULT (OUTPATIENT)
Age: 30
End: 2020-02-18

## 2020-02-18 DIAGNOSIS — O36.5930 MATERNAL CARE FOR OTHER KNOWN OR SUSPECTED POOR FETAL GROWTH, THIRD TRIMESTER, NOT APPLICABLE OR UNSPECIFIED: ICD-10-CM

## 2020-02-18 DIAGNOSIS — O09.293 SUPERVISION OF PREGNANCY WITH OTHER POOR REPRODUCTIVE OR OBSTETRIC HISTORY, THIRD TRIMESTER: ICD-10-CM

## 2020-02-18 DIAGNOSIS — Z3A.37 37 WEEKS GESTATION OF PREGNANCY: ICD-10-CM

## 2020-02-18 PROCEDURE — 76816 OB US FOLLOW-UP PER FETUS: CPT

## 2020-02-18 PROCEDURE — 76820 UMBILICAL ARTERY ECHO: CPT | Mod: 26

## 2020-02-18 PROCEDURE — 76818 FETAL BIOPHYS PROFILE W/NST: CPT | Mod: 26

## 2020-02-18 PROCEDURE — 76820 UMBILICAL ARTERY ECHO: CPT

## 2020-02-18 PROCEDURE — 76818 FETAL BIOPHYS PROFILE W/NST: CPT

## 2020-02-26 DIAGNOSIS — Z01.818 ENCOUNTER FOR OTHER PREPROCEDURAL EXAMINATION: ICD-10-CM

## 2020-03-02 ENCOUNTER — APPOINTMENT (OUTPATIENT)
Dept: ANTEPARTUM | Facility: CLINIC | Age: 30
End: 2020-03-02

## 2020-03-02 ENCOUNTER — INPATIENT (INPATIENT)
Facility: HOSPITAL | Age: 30
LOS: 1 days | Discharge: HOME | End: 2020-03-04
Attending: OBSTETRICS & GYNECOLOGY | Admitting: OBSTETRICS & GYNECOLOGY
Payer: COMMERCIAL

## 2020-03-02 VITALS
DIASTOLIC BLOOD PRESSURE: 59 MMHG | TEMPERATURE: 98 F | RESPIRATION RATE: 18 BRPM | OXYGEN SATURATION: 100 % | SYSTOLIC BLOOD PRESSURE: 114 MMHG | HEART RATE: 74 BPM

## 2020-03-02 DIAGNOSIS — O26.899 OTHER SPECIFIED PREGNANCY RELATED CONDITIONS, UNSPECIFIED TRIMESTER: ICD-10-CM

## 2020-03-02 DIAGNOSIS — Z3A.00 WEEKS OF GESTATION OF PREGNANCY NOT SPECIFIED: ICD-10-CM

## 2020-03-02 DIAGNOSIS — Z34.80 ENCOUNTER FOR SUPERVISION OF OTHER NORMAL PREGNANCY, UNSPECIFIED TRIMESTER: ICD-10-CM

## 2020-03-02 LAB
BLD GP AB SCN SERPL QL: NEGATIVE — SIGNIFICANT CHANGE UP
HCT VFR BLD CALC: 41.4 % — SIGNIFICANT CHANGE UP (ref 34.5–45)
HGB BLD-MCNC: 12.7 G/DL — SIGNIFICANT CHANGE UP (ref 11.5–15.5)
MCHC RBC-ENTMCNC: 28.2 PG — SIGNIFICANT CHANGE UP (ref 27–34)
MCHC RBC-ENTMCNC: 30.7 GM/DL — LOW (ref 32–36)
MCV RBC AUTO: 92 FL — SIGNIFICANT CHANGE UP (ref 80–100)
NRBC # BLD: 0 /100 WBCS — SIGNIFICANT CHANGE UP (ref 0–0)
PLATELET # BLD AUTO: 213 K/UL — SIGNIFICANT CHANGE UP (ref 150–400)
RBC # BLD: 4.5 M/UL — SIGNIFICANT CHANGE UP (ref 3.8–5.2)
RBC # FLD: 13.1 % — SIGNIFICANT CHANGE UP (ref 10.3–14.5)
RH IG SCN BLD-IMP: POSITIVE — SIGNIFICANT CHANGE UP
RH IG SCN BLD-IMP: POSITIVE — SIGNIFICANT CHANGE UP
WBC # BLD: 14.54 K/UL — HIGH (ref 3.8–10.5)
WBC # FLD AUTO: 14.54 K/UL — HIGH (ref 3.8–10.5)

## 2020-03-02 RX ORDER — MAGNESIUM HYDROXIDE 400 MG/1
30 TABLET, CHEWABLE ORAL
Refills: 0 | Status: DISCONTINUED | OUTPATIENT
Start: 2020-03-02 | End: 2020-03-04

## 2020-03-02 RX ORDER — SIMETHICONE 80 MG/1
80 TABLET, CHEWABLE ORAL EVERY 4 HOURS
Refills: 0 | Status: DISCONTINUED | OUTPATIENT
Start: 2020-03-02 | End: 2020-03-04

## 2020-03-02 RX ORDER — KETOROLAC TROMETHAMINE 30 MG/ML
30 SYRINGE (ML) INJECTION ONCE
Refills: 0 | Status: DISCONTINUED | OUTPATIENT
Start: 2020-03-02 | End: 2020-03-02

## 2020-03-02 RX ORDER — SODIUM CHLORIDE 9 MG/ML
3 INJECTION INTRAMUSCULAR; INTRAVENOUS; SUBCUTANEOUS EVERY 8 HOURS
Refills: 0 | Status: DISCONTINUED | OUTPATIENT
Start: 2020-03-02 | End: 2020-03-03

## 2020-03-02 RX ORDER — OXYTOCIN 10 UNIT/ML
333.33 VIAL (ML) INJECTION
Qty: 20 | Refills: 0 | Status: DISCONTINUED | OUTPATIENT
Start: 2020-03-02 | End: 2020-03-04

## 2020-03-02 RX ORDER — BENZOCAINE 10 %
1 GEL (GRAM) MUCOUS MEMBRANE EVERY 6 HOURS
Refills: 0 | Status: DISCONTINUED | OUTPATIENT
Start: 2020-03-02 | End: 2020-03-04

## 2020-03-02 RX ORDER — PRAMOXINE HYDROCHLORIDE 150 MG/15G
1 AEROSOL, FOAM RECTAL EVERY 4 HOURS
Refills: 0 | Status: DISCONTINUED | OUTPATIENT
Start: 2020-03-02 | End: 2020-03-04

## 2020-03-02 RX ORDER — DIPHENHYDRAMINE HCL 50 MG
25 CAPSULE ORAL EVERY 6 HOURS
Refills: 0 | Status: DISCONTINUED | OUTPATIENT
Start: 2020-03-02 | End: 2020-03-04

## 2020-03-02 RX ORDER — HYDROCORTISONE 1 %
1 OINTMENT (GRAM) TOPICAL EVERY 6 HOURS
Refills: 0 | Status: DISCONTINUED | OUTPATIENT
Start: 2020-03-02 | End: 2020-03-04

## 2020-03-02 RX ORDER — OXYCODONE HYDROCHLORIDE 5 MG/1
5 TABLET ORAL
Refills: 0 | Status: DISCONTINUED | OUTPATIENT
Start: 2020-03-02 | End: 2020-03-04

## 2020-03-02 RX ORDER — OXYCODONE HYDROCHLORIDE 5 MG/1
5 TABLET ORAL ONCE
Refills: 0 | Status: DISCONTINUED | OUTPATIENT
Start: 2020-03-02 | End: 2020-03-04

## 2020-03-02 RX ORDER — GLYCERIN ADULT
1 SUPPOSITORY, RECTAL RECTAL AT BEDTIME
Refills: 0 | Status: DISCONTINUED | OUTPATIENT
Start: 2020-03-02 | End: 2020-03-04

## 2020-03-02 RX ORDER — DIBUCAINE 1 %
1 OINTMENT (GRAM) RECTAL EVERY 6 HOURS
Refills: 0 | Status: DISCONTINUED | OUTPATIENT
Start: 2020-03-02 | End: 2020-03-04

## 2020-03-02 RX ORDER — AER TRAVELER 0.5 G/1
1 SOLUTION RECTAL; TOPICAL EVERY 4 HOURS
Refills: 0 | Status: DISCONTINUED | OUTPATIENT
Start: 2020-03-02 | End: 2020-03-04

## 2020-03-02 RX ORDER — TETANUS TOXOID, REDUCED DIPHTHERIA TOXOID AND ACELLULAR PERTUSSIS VACCINE, ADSORBED 5; 2.5; 8; 8; 2.5 [IU]/.5ML; [IU]/.5ML; UG/.5ML; UG/.5ML; UG/.5ML
0.5 SUSPENSION INTRAMUSCULAR ONCE
Refills: 0 | Status: DISCONTINUED | OUTPATIENT
Start: 2020-03-02 | End: 2020-03-04

## 2020-03-02 RX ORDER — LANOLIN
1 OINTMENT (GRAM) TOPICAL EVERY 6 HOURS
Refills: 0 | Status: DISCONTINUED | OUTPATIENT
Start: 2020-03-02 | End: 2020-03-04

## 2020-03-02 RX ORDER — IBUPROFEN 200 MG
600 TABLET ORAL EVERY 6 HOURS
Refills: 0 | Status: COMPLETED | OUTPATIENT
Start: 2020-03-02 | End: 2021-01-29

## 2020-03-02 RX ORDER — ACETAMINOPHEN 500 MG
975 TABLET ORAL
Refills: 0 | Status: DISCONTINUED | OUTPATIENT
Start: 2020-03-02 | End: 2020-03-04

## 2020-03-02 RX ADMIN — Medication 30 MILLIGRAM(S): at 20:24

## 2020-03-02 RX ADMIN — Medication 1000 MILLIUNIT(S)/MIN: at 21:22

## 2020-03-03 LAB
HCT VFR BLD CALC: 31.1 % — LOW (ref 34.5–45)
HGB BLD-MCNC: 10.1 G/DL — LOW (ref 11.5–15.5)
T PALLIDUM AB TITR SER: NEGATIVE — SIGNIFICANT CHANGE UP

## 2020-03-03 RX ORDER — IBUPROFEN 200 MG
600 TABLET ORAL EVERY 6 HOURS
Refills: 0 | Status: DISCONTINUED | OUTPATIENT
Start: 2020-03-03 | End: 2020-03-04

## 2020-03-03 RX ADMIN — Medication 600 MILLIGRAM(S): at 09:25

## 2020-03-03 RX ADMIN — Medication 975 MILLIGRAM(S): at 00:45

## 2020-03-03 RX ADMIN — Medication 600 MILLIGRAM(S): at 02:55

## 2020-03-03 RX ADMIN — Medication 975 MILLIGRAM(S): at 17:57

## 2020-03-03 RX ADMIN — Medication 600 MILLIGRAM(S): at 21:31

## 2020-03-03 RX ADMIN — Medication 600 MILLIGRAM(S): at 16:15

## 2020-03-03 RX ADMIN — Medication 600 MILLIGRAM(S): at 15:40

## 2020-03-03 RX ADMIN — Medication 600 MILLIGRAM(S): at 09:00

## 2020-03-03 RX ADMIN — Medication 975 MILLIGRAM(S): at 18:30

## 2020-03-03 RX ADMIN — Medication 975 MILLIGRAM(S): at 11:59

## 2020-03-03 RX ADMIN — Medication 600 MILLIGRAM(S): at 22:01

## 2020-03-03 RX ADMIN — Medication 600 MILLIGRAM(S): at 03:25

## 2020-03-03 RX ADMIN — Medication 975 MILLIGRAM(S): at 12:40

## 2020-03-03 RX ADMIN — Medication 975 MILLIGRAM(S): at 01:15

## 2020-03-03 NOTE — PROGRESS NOTE ADULT - PROBLEM SELECTOR PLAN 1
- Pain well controlled, continue current pain regimen  - Increase ambulation, SCDs when not ambulating  - Continue regular diet    Phyllis Lagunas, PGY-1

## 2020-03-03 NOTE — PROGRESS NOTE ADULT - SUBJECTIVE AND OBJECTIVE BOX
Postpartum Note- PPD#1      S:Patient w/o complaints, pain is controlled.    Pt is OOB, tolerating PO, voiding. Vaginal bleeding mild to moderate.       O:  Vital Signs Last 24 Hrs  T(C): 36.8 (03 Mar 2020 06:00), Max: 37.2 (02 Mar 2020 22:30)  T(F): 98.2 (03 Mar 2020 06:00), Max: 99 (02 Mar 2020 22:30)  HR: 52 (03 Mar 2020 06:00) (52 - 76)  BP: 105/58 (03 Mar 2020 06:00) (100/61 - 120/69)  BP(mean): --  RR: 18 (03 Mar 2020 06:00) (16 - 18)  SpO2: 97% (03 Mar 2020 00:05) (97% - 100%)         LABS:    Hemoglobin: 10.1 g/dL (03-03 @ 07:37)  Hemoglobin: 12.7 g/dL (03-02 @ 21:05)      Hematocrit: 31.1 % (03-03 @ 07:37)  Hematocrit: 41.4 % (03-02 @ 21:05)

## 2020-03-03 NOTE — PROGRESS NOTE ADULT - SUBJECTIVE AND OBJECTIVE BOX
OB Progress Note:  PPD#1    S: 30yo PPD#1 s/p , delivery precipitously. Patient feels well. Pain is well controlled. She is tolerating a regular diet and passing flatus. She is voiding spontaneously, and ambulating without difficulty. Denies CP/SOB. Denies lightheadedness/dizziness. Denies N/V.    O:  Vitals:  Vital Signs Last 24 Hrs  T(C): 36.9 (03 Mar 2020 00:05), Max: 37.2 (02 Mar 2020 22:30)  T(F): 98.4 (03 Mar 2020 00:05), Max: 99 (02 Mar 2020 22:30)  HR: 76 (03 Mar 2020 00:05) (58 - 76)  BP: 100/61 (03 Mar 2020 00:05) (100/61 - 120/69)  BP(mean): --  RR: 18 (03 Mar 2020 00:05) (16 - 18)  SpO2: 97% (03 Mar 2020 00:05) (97% - 100%)    MEDICATIONS  (STANDING):  acetaminophen   Tablet .. 975 milliGRAM(s) Oral <User Schedule>  diphtheria/tetanus/pertussis (acellular) Vaccine (ADAcel) 0.5 milliLiter(s) IntraMuscular once  ibuprofen  Tablet. 600 milliGRAM(s) Oral every 6 hours  oxytocin Infusion 333.333 milliUNIT(s)/Min (1000 mL/Hr) IV Continuous <Continuous>  prenatal multivitamin 1 Tablet(s) Oral daily  sodium chloride 0.9% lock flush 3 milliLiter(s) IV Push every 8 hours      Labs:  Blood type: A Positive  Rubella IgG: RPR:                           12.7   14.54<H> >-----------< 213    (  @ 21:05 )             41.4      Physical Exam:  General: NAD  Abdomen: soft, non-tender, non-distended, fundus firm  Vaginal: Lochia wnl  Extremities: No erythema/edema

## 2020-03-03 NOTE — PROGRESS NOTE ADULT - ASSESSMENT
Per Dr. Aldana-  Abdomen: Soft, nontender, non-distended, fundus firm.  Vaginal: Lochia WNL.   Ext: Neg edema, Neg calf tenderness

## 2020-03-04 ENCOUNTER — TRANSCRIPTION ENCOUNTER (OUTPATIENT)
Age: 30
End: 2020-03-04

## 2020-03-04 VITALS
TEMPERATURE: 98 F | HEART RATE: 53 BPM | SYSTOLIC BLOOD PRESSURE: 112 MMHG | RESPIRATION RATE: 18 BRPM | DIASTOLIC BLOOD PRESSURE: 66 MMHG

## 2020-03-04 PROCEDURE — 85018 HEMOGLOBIN: CPT

## 2020-03-04 PROCEDURE — G0463: CPT

## 2020-03-04 PROCEDURE — 90707 MMR VACCINE SC: CPT

## 2020-03-04 PROCEDURE — 86901 BLOOD TYPING SEROLOGIC RH(D): CPT

## 2020-03-04 PROCEDURE — 86850 RBC ANTIBODY SCREEN: CPT

## 2020-03-04 PROCEDURE — 59025 FETAL NON-STRESS TEST: CPT

## 2020-03-04 PROCEDURE — 85027 COMPLETE CBC AUTOMATED: CPT

## 2020-03-04 PROCEDURE — 85014 HEMATOCRIT: CPT

## 2020-03-04 PROCEDURE — 59050 FETAL MONITOR W/REPORT: CPT

## 2020-03-04 PROCEDURE — 86900 BLOOD TYPING SEROLOGIC ABO: CPT

## 2020-03-04 PROCEDURE — 86780 TREPONEMA PALLIDUM: CPT

## 2020-03-04 RX ORDER — IBUPROFEN 200 MG
1 TABLET ORAL
Qty: 0 | Refills: 0 | DISCHARGE
Start: 2020-03-04

## 2020-03-04 RX ORDER — ACETAMINOPHEN 500 MG
3 TABLET ORAL
Qty: 0 | Refills: 0 | DISCHARGE
Start: 2020-03-04

## 2020-03-04 RX ADMIN — Medication 975 MILLIGRAM(S): at 12:10

## 2020-03-04 RX ADMIN — Medication 975 MILLIGRAM(S): at 06:52

## 2020-03-04 RX ADMIN — Medication 975 MILLIGRAM(S): at 00:36

## 2020-03-04 RX ADMIN — Medication 600 MILLIGRAM(S): at 10:00

## 2020-03-04 RX ADMIN — Medication 0.5 MILLILITER(S): at 09:56

## 2020-03-04 RX ADMIN — Medication 975 MILLIGRAM(S): at 00:06

## 2020-03-04 RX ADMIN — Medication 600 MILLIGRAM(S): at 09:29

## 2020-03-04 RX ADMIN — Medication 975 MILLIGRAM(S): at 12:40

## 2020-03-04 RX ADMIN — Medication 975 MILLIGRAM(S): at 06:22

## 2020-03-04 NOTE — DISCHARGE NOTE OB - MATERIALS PROVIDED
Back To Sleep Handout/Breastfeeding Log/Breastfeeding Mother’s Support Group Information/Birth Certificate Instructions/Discharge Medication Information for Patients and Families Pocket Guide/[facility] Hearing Screen Program/Breastfeeding Guide and Packet/MMR Vaccination (VIS Pub Date: 2012)/  Immunization Record/Shaken Baby Prevention Handout/Vaccinations/[facility] [place] Screening Program Birth Certificate Instructions/  Immunization Record/Back To Sleep Handout/MMR Vaccination (VIS Pub Date: 2012)/Breastfeeding Mother’s Support Group Information/[facility]  Screening Program/[facility] Hearing Screen Program/Shaken Baby Prevention Handout/Vaccinations

## 2020-03-04 NOTE — DISCHARGE NOTE OB - PATIENT PORTAL LINK FT
You can access the FollowMyHealth Patient Portal offered by [facility] by registering at the following website: [email or web address]. By joining [employer]’s FollowMyHealth portal, you will also be able to view your health information using other applications (apps) compatible with our system.

## 2020-03-04 NOTE — DISCHARGE NOTE OB - PLAN OF CARE
The patient is a 67y Male complaining of recovery return to baseline health, regular diet, pain control, follow up with Dr Aldana

## 2020-03-04 NOTE — PROGRESS NOTE ADULT - SUBJECTIVE AND OBJECTIVE BOX
Postpartum Note- PPD#2      S:Patient w/o complaints, pain is controlled.    Pt is OOB, tolerating PO, voiding. Vaginal bleeding mild to moderate.       O:  Vital Signs Last 24 Hrs  T(C): 36.8 (04 Mar 2020 06:14), Max: 36.8 (04 Mar 2020 06:14)  T(F): 98.2 (04 Mar 2020 06:14), Max: 98.2 (04 Mar 2020 06:14)  HR: 53 (04 Mar 2020 06:14) (53 - 77)  BP: 112/66 (04 Mar 2020 06:14) (94/57 - 112/66)  BP(mean): --  RR: 18 (04 Mar 2020 06:14) (18 - 18)  SpO2: --         LABS:    Hemoglobin: 10.1 g/dL (03-03 @ 07:37)  Hemoglobin: 12.7 g/dL (03-02 @ 21:05)      Hematocrit: 31.1 % (03-03 @ 07:37)  Hematocrit: 41.4 % (03-02 @ 21:05)

## 2020-03-04 NOTE — PROGRESS NOTE ADULT - PROBLEM/PLAN-1
Admitted:     09/21/2019      CHIEF COMPLAINT:  Fever.      HISTORY OF PRESENT ILLNESS:  Had been obtained mainly from his mother, who was present in ER at the time of my examination; the patient is basically nonverbal.  I do know him from prior hospitalization.  He has multiple hospitalizations in [facility] due to sepsis due to aspiration pneumonia and UTIs.      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      The patient is well known to Hospitalist Service at [facility].  He had 15 admissions this year for similar issues of fevers at home and thought to be due to recurrent aspiration pneumonia versus pneumonitis.  It seems that in 08/2019 he was admitted to the [facility] for septic shock that was thought to be due to a UTI.  He was on pressors at that time and treated with IV antibiotics and discharged home on oral antibiotics.  It seems that each time the patient is admitted to the hospital for fevers, he is started on IV antibiotics, and he improves pretty quickly in the next 24 or 48 hours.  Overall, his hospitalizations are short, 2-4 days, but he does have history of recurrent readmissions.      Last few hospitalizations were from 09/13 to 09/15/2019 for a fever with SIRS.  At that time, his chest x-ray was unremarkable.  He was readmitted the very next day on 09/16 for recurrent fever, thought to be due to recurrent sepsis due to recurrent aspiration pneumonias.  He was started on IV Zosyn in the hospital and  was seen by ID specialist.  One blood culture out of 2 was positive for Staph hominis that was felt to be contaminant.  He was discharged home yesterday, 09/20/2019 on p.o. Augmentin for 7 more days.  Apparently, he was afebrile when he went home.      His mother states that earlier today he started having some episodes when he was acting like throwing up, although he did not really vomit.  She checked his axillary temperature, and it was 100.9.  She states that he looked sick at home, and she gave him 2 Tylenol, and after that somehow he looked better, but she decided to bring him to ER for further evaluation given his history.  Upon further questioning, he does not seem to have a reported any chest pain, no shortness of breath, no headaches.  He is getting tube feedings at 12 hours per day from 7:00 a.m. to 7:00 p.m.  He has a home health care including home RN 12 hours per day and a PCA at nighttime.  He goes to day programs 4 times a week.      After reviewing his chart, it seems that goals of care were discussed multiple times with his mother in the past.  It was mentioned that maybe Palliative Care might be helpful to be involved, but it seems that his mother was not open to this approach in the past.      In ER, she was seen by Dr. Moyer.  His initial vitals in ER showed a blood pressure 111/65, heart rate 110s.  He had fever of 103.1 in ER, respiratory rate 20, oxygen saturation 100% on room air.      In ER, he had basic blood work which showed a BMP with mildly decreased sodium of 132, potassium 4.3, chloride 99, bicarbonate 27, BUN 21, creatinine 0.72.  His albumin was 2.9, total protein 8.1, total bilirubin 0.3, alkaline phosphatase 78, ALT 17, AST 17, lactic acid 2.1.  Glucose was 97.  His VBG with pH 7.46, pCO2 of 42, pO2 of 28.  His white blood cells were elevated at 12.1, hemoglobin 11.8, hematocrit 35.7, platelet count 278,000.  His UA is negative.  Blood culture is pending at this time.  He had  a chest x-ray, which shows a patchy opacity in the right mid lung, superimposed upon pulmonary vascular crowding could represent infiltrate or pulmonary edema but seems similar to prior study.      In ER, he received 1 dose of Zosyn and 1 dose of vancomycin as broad-spectrum coverage, and Hospitalist Service was called regarding the admission.      PAST MEDICAL HISTORY:   1.  Traumatic brain injury with residual aphasia and right-sided spastic hemiplegia.   2.  Recurrent aspiration pneumonia versus pneumonitis that required multiple hospitalizations to [facility].  He had 15 hospitalizations this year.   3.  Panhypopituitarism related to his TBI.   4.  Hypothyroidism.   5.  History of septic shock in 08/2019 due to urinary tract infection.   6.  History of seizure disorder.   7.  History of upper extremity DVT.   8.  Gastroesophageal reflux disease.   9.  History of necrotizing pancreatitis.      PAST SURGICAL HISTORY:  Past Surgical History:   Procedure Laterality Date     ENDOSCOPIC ULTRASOUND UPPER GASTROINTESTINAL TRACT (GI) N/A 1/30/2017    Procedure: ENDOSCOPIC ULTRASOUND, ESOPHAGOSCOPY / UPPER GASTROINTESTINAL TRACT (GI);  Surgeon: Jus Montana MD;  Location: [facility] OR     ENDOSCOPIC ULTRASOUND, ESOPHAGOSCOPY, GASTROSCOPY, DUODENOSCOPY (EGD), NECROSECTOMY N/A 2/7/2017    Procedure: ENDOSCOPIC ULTRASOUND, ESOPHAGOSCOPY, GASTROSCOPY, DUODENOSCOPY (EGD), NECROSECTOMY;  Surgeon: Jack Marcus MD;  Location: [facility] OR     ESOPHAGOSCOPY, GASTROSCOPY, DUODENOSCOPY (EGD), COMBINED  3/13/2014    Procedure: COMBINED ESOPHAGOSCOPY, GASTROSCOPY, DUODENOSCOPY (EGD), BIOPSY SINGLE OR MULTIPLE;  gastroscopy;  Surgeon: Digna Rhodes MD;  Location: [facility]     ESOPHAGOSCOPY, GASTROSCOPY, DUODENOSCOPY (EGD), COMBINED N/A 12/6/2016    Procedure: COMBINED ESOPHAGOSCOPY, GASTROSCOPY, DUODENOSCOPY (EGD);  Surgeon: Digna Rhodes MD;  Location: [facility]     ESOPHAGOSCOPY, GASTROSCOPY, DUODENOSCOPY  (EGD), COMBINED N/A 2/7/2017    Procedure: COMBINED ENDOSCOPIC ULTRASOUND, ESOPHAGOSCOPY, GASTROSCOPY, DUODENOSCOPY (EGD), FINE NEEDLE ASPIRATE/BIOPSY;  Surgeon: Too Thakur MD;  Location:  OR     HEAD & NECK SURGERY      reconstructive facial surgery following accident in 1989     IR FOLLOW UP VISIT INPATIENT  2/20/2019     IR GASTRO JEJUNOSTOMY TUBE CHANGE  12/20/2018     IR GASTRO JEJUNOSTOMY TUBE CHANGE  2/4/2019     IR GASTRO JEJUNOSTOMY TUBE CHANGE  3/8/2019     IR GASTRO JEJUNOSTOMY TUBE CHANGE  8/7/2019     IR PICC EXCHANGE LEFT  8/15/2019     LAPAROSCOPIC APPENDECTOMY  7/30/2013    Procedure: LAPAROSCOPIC APPENDECTOMY;  LAPAROSCOPIC APPENDECTOMY;  Surgeon: Manish Pierce MD;  Location:  OR     LAPAROSCOPIC ASSISTED INSERTION TUBE GASTROTOMY N/A 9/7/2016    Procedure: LAPAROSCOPIC ASSISTED INSERTION TUBE GASTROSTOMY;  Surgeon: Manish Pierce MD;  Location:  OR     ORTHOPEDIC SURGERY      right hand repair     TRACHEOSTOMY N/A 9/3/2016    Procedure: TRACHEOSTOMY;  Surgeon: João Ortiz MD;  Location:  OR     TRACHEOSTOMY N/A 12/2/2016    Procedure: TRACHEOSTOMY;  Surgeon: João Ortiz MD;  Location:  OR     VASCULAR SURGERY          SOCIAL HISTORY:  The patient lives at home with his mother.  He has a home health care.  He has a home RN 12 hours per day.  He also has a PCA during the night.  He also has day program 4 days a week.  He does not drink.  He does not smoke, and there is no reported illicit drug abuse.      FAMILY HISTORY:    Family History     Problem (# of Occurrences) Relation (Name,Age of Onset)    Cancer (1) Father           [id number] MEDICATIONS:    No current facility-administered medications on file prior to encounter.   acetylcysteine (MUCOMYST) 20 % neb solution, Take 2 mLs by nebulization every 6 hours With albuterol at 0700, 1100, 1500, and 1900  albuterol (PROVENTIL) (5 MG/ML) 0.5% neb solution, Take 2.5 mg by nebulization  DISPLAY PLAN FREE TEXT every 4 hours (while awake) 0700 1100 1500 1900 with mucomyst   amoxicillin-clavulanate (AUGMENTIN) 875-125 MG tablet, Take 1 tablet through feeding tube 2 times daily. Day 1 was 9/20/2019  aspirin (ASA) 81 MG chewable tablet, 81 mg by Oral or Feeding Tube route daily At 0900  bacitracin ointment, Apply topically daily as needed for wound care To PEG site.   Bioflavonoid Products (VITAMIN C PLUS) 1000 MG TABS, 1 tablet by Oral or FT or NG tube route  Brivaracetam (BRIVIACT) 10 MG/ML solution, 100 mg by Oral or Feeding Tube route 2 times daily 0900, 2100  calcium carbonate 1250 (500 CA) MG/5ML SUSP suspension, 5 mLs (1,250 mg) by Per J Tube route 3 times daily (with meals)  carBAMazepine (TEGRETOL) 100 MG/5ML suspension, 150 mg by Oral or Feeding Tube route every 6 hours At 06:00, 12:00, 18:00 and 24:00 for seizures   ferrous sulfate 220 (44 Fe) MG/5ML ELIX, 220 mg by Per Feeding Tube route daily  Guar Gum (FIBER MODULAR, NUTRISOURCE FIBER,) packet, 1 packet by Per G Tube route daily  hydrocortisone (CORTEF) 5 MG tablet, 10 mg by Oral or FT or NG tube route daily (with dinner) At 1500  hydrocortisone (CORTEF) 5 MG tablet, 20 mg by Oral or FT or NG tube route every morning   hydrocortisone 1 % CREA cream, Place rectally 2 times daily as needed for other Apply to reddened memo areas as needed  ipratropium - albuterol 0.5 mg/2.5 mg/3 mL (DUONEB) 0.5-2.5 (3) MG/3ML neb solution, Take 1 vial by nebulization every 4 hours as needed (bronchospasms or cough)  levothyroxine (SYNTHROID/LEVOTHROID) 150 MCG tablet, Take 150 mcg by mouth every morning  melatonin (MELATONIN) 1 MG/ML LIQD liquid, 6 mg by Per NG tube route At Bedtime   metoclopramide (REGLAN) 5 MG/5ML solution, 5 mg by Per Feeding Tube route 2 times daily  miconazole (MICATIN) 2 % AERP powder, Apply topically 2 times daily as needed   mupirocin (BACTROBAN) 2 % external ointment, Apply topically 2 times daily as needed   pantoprazole (PROTONIX) 2 mg/mL SUSP  suspension, 20 mLs (40 mg) by Per J Tube route daily  potassium & sodium phosphates (NEUTRA-PHOS) 280-160-250 MG Packet, Take 1 packet by mouth 3 times daily 0900, 1500, 2100.   Skin Protectants, Misc. (BALMEX SKIN PROTECTANT) OINT, Externally apply topically 2 times daily as needed (irritation) Applay to reddened memo areas twice daily as needed  testosterone cypionate (DEPOTESTOTERONE CYPIONATE) 200 MG/ML injection, Inject 76 mg into the muscle See Admin Instructions Every 2 weeks on Fridays   76 mg or 0.38 mL  vitamin D3 (CHOLECALCIFEROL) 2000 units (50 mcg) tablet, Take 2,000 Units by mouth daily Crush and feed via j-tube @@ 0900  order for DME, Equipment being ordered: Nebulizer         ALLERGIES:    Allergies   Allergen Reactions     Dilantin [Phenytoin Sodium]      Valproic Acid      Toxicity c bone marrow suspension, elevated ammonia levels         REVIEW OF SYSTEMS:  A 10-point review of systems was conducted and it was negative except for pertinent positives mentioned in history of present illness.      PHYSICAL EXAMINATION:   VITAL SIGNS:  Blood pressure is 110/68, temperature 103.1, heart rate 110, respiratory rate 22, oxygen saturation % on room air.   GENERAL:  The patient is awake, alert, no acute distress at the time of my examination.  He is intermittently smiling.  He does seem to have some coughing spells.   HEENT:  Normocephalic, atraumatic.  Pupils are equally round and reactive to light.  Oral mucosa is moist.   NECK:  Supple.  No cervical lymphadenopathy, no thyromegaly.   CHEST:  There is bilateral air entry with mild crackles at the right base, no wheezing, no rales.   CARDIOVASCULAR:  There is normal S1, S2, mild tachycardia, no murmurs, no rubs.   ABDOMEN:  Soft, nontender, nondistended.  Bowel sounds are present.  G-tube in place.   EXTREMITIES:  He has residual spastic right-sided hemiplegia, no calf tenderness, no leg swelling, 2+ peripheral pulses are palpable.   SKIN:  Intact,  no rash, no cyanosis.   NEUROLOGIC:  The patient is mostly nonverbal.  He smiles.  He is able to follow some commands.  He has residual right-sided spastic hemiplegia.   PSYCHIATRIC:  Normal mood, normal affect.   MUSCULOSKELETAL:  There are no obvious joint deformities.      LABORATORY DATA:  Reviewed and dictated above.      ASSESSMENT:  Keyon Farias is a 59-year-old gentleman with past medical history of traumatic brain injury with residual aphasia and right-sided spastic hemiplegia, seizure disorder, history of panhypopituitarism and hypothyroidism, gastroesophageal reflux disease, dysphagia status post PEG placement and recurrent aspiration pneumonia that required multiple hospitalizations in [facility], who was just discharged from the hospital yesterday, 09/20/2019, and returns back to the hospital for evaluation of fever.      PLAN:   1.  Sepsis, likely due to recurrent aspiration pneumonia versus pneumonitis:  He has severe dysphagia.  He is status post PEG tube placement.  He had 15 admissions to [facility] this year for similar symptoms of fevers related to aspiration pneumonias.  Last hospitalization was actually from 09/16 to 09/20/2019.  He was treated with IV Zosyn in the hospital and discharged home on p.o.  Augmentin for 1 week.  He was afebrile at the time of the discharge, and there was no leukocytosis.  Earlier today, he started having low-grade fevers again at home and started looking sick, so his mother brought him again to the ER.  In the ER, he had high fever of 103 degrees Fahrenheit.  He has white blood cells of 12.1.  His UA is negative.  Chest x-ray showed patchy opacity in the right mid lung, superimposed upon pulmonary vascular crowding that could represent infiltrate or pulmonary edema that seems to be similar to the prior study.  In the ER, he was given 1 dose of Zosyn and 1 dose of vancomycin.  At this time, it is not clear if his presentation is related to  recurrent aspiration pneumonia versus incomplete treatment of a prior aspiration pneumonia versus possible healthcare-associated pneumonia.  So, we will continue with broad-spectrum antibiotics at this time, Zosyn and vancomycin.  We will start him on mild IV hydration, normal saline at 75 mL per hour.  We will monitor fever curve and white blood cell trend and monitor cultures.   2.  Hyponatremia, mild:  Sodium is 132.  Will start him on IV fluids and repeat BMP in the morning.   3.  History of traumatic brain injury with residual aphasia, dysphagia and spastic right-sided hemiplegia:  He gets home health services as mentioned above.   consult requested.   4.  History of seizure disorder:  We will continue his ygcgi-ls-lopfavugy Tegretol and Briviact   5.  History of panhypopituitarism:  We will continue his dqjgb-yx-zrxenitvl hydrocortisone 20 mg every morning and 10 mg with supper.   6.  Hypothyroidism:  We will continue his cyezz-ww-eswxkuvxy levothyroxine.   7.  Dysphagia:  Status post PEG placement, but with recurrent aspiration pneumonia that required hospitalization.  We will have Nutrition consult to resume his tube feedings.  Head of bed should be elevated at 45 degrees.   8.  History of pancreatic tail cyst that was evaluated by Dr. Davis recently, and it was recommended to follow up CT scan of the abdomen in a few months.   9.  Deep venous thrombosis prophylaxis:  The patient is mainly nonambulatory, so will order pneumatic compression device for now.   10.  Code status:  Discussed with the patient's mother, and she is clear that she wants him to be a full code.   11.  Regarding goals of care, this had been discussed in the past few times with the patient's mother, who was not receptive to discuss Palliative Care at that time.  Given his recurrent hospitalizations, it may be worth trying to address this issue again during this hospitalization.      DISPOSITION:  Admit inpatient.  I  anticipate a couple of days of hospitalization.         TANISHA DAVIES MD             D: 2019   T: 2019   MT: TOVA      Name:     BERT BARAJAS   MRN:      [id number]        Account:      [id number]   :      1960        Admitted:     2019                   Document: [id number]       cc: Carlos Gomez MD

## 2020-03-04 NOTE — DISCHARGE NOTE OB - MEDICATION SUMMARY - MEDICATIONS TO STOP TAKING
I will STOP taking the medications listed below when I get home from the hospital:    ibuprofen 600 mg oral tablet  -- 1 tab(s) by mouth every 6 hours, As Needed    acetaminophen 325 mg oral tablet  -- 3 tab(s) by mouth every 6 hours, As Needed

## 2020-03-04 NOTE — DISCHARGE NOTE OB - CARE PROVIDER_API CALL
Ann Aldana)  Obstetrics and Gynecology  [address] [facility] [address]  [place] [address]  Phone: [phone]  Fax: [phone]  Follow Up Time:

## 2020-10-21 NOTE — ED ADULT TRIAGE NOTE - MODE OF ARRIVAL
EMS
keep dressing dry, clean, intact, for 2 days. After 2 days, remove dressing and leave steri strips on. You can shower with steri strips on. If it fall off after shower is OK, if not you leave it there, it will fall off by itself in 2-3 days.

## 2020-11-08 NOTE — DISCHARGE NOTE OB - AVOID PROLONGED STANDING
History of Present Illness





- Stated complaint


Stated Complaint: WEAKNESS





- Chief complaint


Chief Complaint: General





- History obtained from


History obtained from: Patient





- History of Present Illness


Timing: How many days ago (2)


Pain level max: 0


Pain level now: 0





- Additonal information


Additional information: 





78-year-old female presents to the emergency department stating that she has had

generalized weakness over the past few days.  States when she gets up she gets 

out of breath easily.  Better with sitting, worse with walking.  No chest pain. 

No abdominal pain.  No vomiting.  No blood in the stool.  She states last time 

this happened she was in atrial fibrillation.  She states she does not have a h

istory of heart failure.  Does not use oxygen at home. She does have a history 

of MS.  She states that this does not feel like her MS.  She states that she 

does occasionally miss doses of her medications.





Review of Systems


Ten Systems: 10 systems reviewed and negative


Constitutional: denies: Fever, Chills


Nose: denies: Rhinorrhea / runny nose, Congestion


Throat: denies: Sore throat


Cardiac: denies: Chest pain / pressure


GI: denies: Abdominal Pain, Vomiting, Diarrhea


Skin: denies: Rash


Musculoskeletal: denies: Neck pain, Back pain


Neurologic: denies: Headache





PD PAST MEDICAL HISTORY





- Past Medical History


Past Medical History: Yes


Cardiovascular: Hypertension


Respiratory: None


Neuro: Head injury, Multiple sclerosis


Endocrine/Autoimmune: HyPOthyroidism


GI: GERD, Colon polyps, Chronic constipation


GYN: Breast cancer, Other


: Incontinence


HEENT: Chronic vision loss


Psych: Depression


Musculoskeletal: Osteoarthritis


Derm: None





- Past Surgical History


Past Surgical History: Yes


General: Appendectomy


/GYN:  section, Tubal ligation, Mastectomy


HEENT: Tonsil/Adenoidectomy





- Present Medications


Home Medications: 


                                Ambulatory Orders











 Medication  Instructions  Recorded  Confirmed


 


Levothyroxine [Synthroid] 75 mcg PO DAILY 08/15/16 02/22/19


 


Omeprazole [PriLOSEC] 20 mg PO DAILY 08/15/16 02/22/19


 


Tramadol HCl 50 - 100 mg PO Q6HR PRN #20 tablet 08/15/16 02/21/19


 


Ascorbic Acid 2,000 mg PO DAILY 19


 


Calcium Carbonate [[id number]] 500 mg PO BID 19


 


Cholecalciferol (Vitamin D3) 1,000 unit PO DAILY 19





[Vitamin D3]   


 


Furosemide [Lasix] 20 mg PO Q48H #30 tablet 19


 


Lactobacillus Acidophilus 1 each PO DAILY 19





[Probiotic Acidophilus]   


 


Lecithin, Soy [Lecithin] 1,200 mg PO DAILY 19


 


Multivitamin [Theragran] 1 each PO DAILY 19


 


Potassium Chloride [Micro-K] 10 meq PO Q48H #30 capsule 19 


 


Cephalexin [Keflex] 500 mg PO Q6H #20 capsule 20 














- Allergies


Allergies/Adverse Reactions: 


                                    Allergies











Allergy/AdvReac Type Severity Reaction Status Date / Time


 


No Known Drug Allergies Allergy   Verified 20 17:20














- Social History


Does the pt smoke?: No


Smoking Status: Former smoker


Does the pt drink ETOH?: Yes


Does the pt have substance abuse?: Yes


Substance Use and Type: Marijuana





- Immunizations


Immunizations are current?: Yes





- POLST


Patient has POLST: No


POLST Status: Full Code





PD ED PE NORMAL





- Vitals


Vital signs reviewed: Yes





- General


General: Alert and oriented X 3, No acute distress





- HEENT


HEENT: Moist mucous membranes





- Neck


Neck: Supple, no meningeal sign





- Cardiac


Cardiac: RRR, Strong equal pulses





- Respiratory


Respiratory: No respiratory distress, Clear bilaterally





- Abdomen


Abdomen: Soft, Non tender, Non distended





- Derm


Derm: Warm and dry





- Extremities


Extremities: No edema





- Neuro


Neuro: Alert and oriented X 3





- Psych


Psych: Normal mood, Normal affect





Results





- Vitals


Vitals: 


                               Vital Signs - 24 hr











  20





  17:20 17:37 18:33


 


Temperature 36.8 C  


 


Heart Rate 60 59 L 61


 


Respiratory 18 16 14





Rate   


 


Blood Pressure 115/63 98/60 97/64


 


O2 Saturation 100 99 100














  20





  20:35


 


Temperature 


 


Heart Rate 64


 


Respiratory 18





Rate 


 


Blood Pressure 108/71


 


O2 Saturation 98








                                     Oxygen











O2 Source                      Room air

















- EKG (time done)


  ** 1730


Rate: Rate (enter#) (62)


Rhythm: NSR


Axis: Normal


Intervals: Normal GA


QRS: Normal


Ischemia: Normal ST segments





- Labs


Labs: 


                                Laboratory Tests











  20





  18:00 18:00 18:00


 


WBC  14.2 H  


 


RBC  3.71 L  


 


Hgb  11.9 L  


 


Hct  36.6 L  


 


MCV  98.7  


 


MCH  32.1 H  


 


MCHC  32.5  


 


RDW  13.0  


 


Plt Count  240  


 


MPV  10.4  


 


Neut # (Auto)  11.6 H  


 


Lymph # (Auto)  0.8 L  


 


Mono # (Auto)  1.5 H  


 


Eos # (Auto)  0.2  


 


Baso # (Auto)  0.1  


 


Absolute Nucleated RBC  0.00  


 


Nucleated RBC %  0.0  


 


Sodium   137 


 


Potassium   4.4 


 


Chloride   102 


 


Carbon Dioxide   24 


 


Anion Gap   11.0 


 


BUN   34 H 


 


Creatinine   1.9 H 


 


Estimated GFR (MDRD)   26 L 


 


Glucose   127 H 


 


Calcium   9.5 


 


Total Bilirubin   0.8 


 


AST   23 


 


ALT   18 


 


Alkaline Phosphatase   60 


 


Troponin I High Sens    6.7


 


B-Natriuretic Peptide   


 


Total Protein   7.1 


 


Albumin   3.8 


 


Globulin   3.3 


 


Albumin/Globulin Ratio   1.2 


 


Lipase   34 


 


Urine Color   


 


Urine Clarity   


 


Urine pH   


 


Ur Specific Gravity   


 


Urine Protein   


 


Urine Glucose (UA)   


 


Urine Ketones   


 


Urine Occult Blood   


 


Urine Nitrite   


 


Urine Bilirubin   


 


Urine Urobilinogen   


 


Ur Leukocyte Esterase   


 


Ur Microscopic Review   


 


Urine Culture Comments   


 


Nasal Adenovirus (PCR)   


 


Nasal B. parapertussis DNA (PCR)   


 


Nasal Coronavir 229E PCR   


 


Nasal Coronavir HKU1 PCR   


 


Nasal Coronavir NL63 PCR   


 


Nasal Coronavir OC43 PCR   


 


Nasal Enterovir/Rhinovir PCR   


 


Nasal Influenza B PCR   


 


Nasal Influenza A PCR   


 


Nasal Parainfluen 1 PCR   


 


Nasal Parainfluen 2 PCR   


 


Nasal Parainfluen 3 PCR   


 


Nasal Parainfluen 4 PCR   


 


Nasal RSV (PCR)   


 


Nasal B.pertussis DNA PCR   


 


Nasal C.pneumoniae (PCR)   


 


Smith Human Metapneumo PCR   


 


Nasal M.pneumoniae (PCR)   


 


Nasal SARS-CoV-2 (PCR)   














  20





  18:00 19:05 21:02


 


WBC   


 


RBC   


 


Hgb   


 


Hct   


 


MCV   


 


MCH   


 


MCHC   


 


RDW   


 


Plt Count   


 


MPV   


 


Neut # (Auto)   


 


Lymph # (Auto)   


 


Mono # (Auto)   


 


Eos # (Auto)   


 


Baso # (Auto)   


 


Absolute Nucleated RBC   


 


Nucleated RBC %   


 


Sodium   


 


Potassium   


 


Chloride   


 


Carbon Dioxide   


 


Anion Gap   


 


BUN   


 


Creatinine   


 


Estimated GFR (MDRD)   


 


Glucose   


 


Calcium   


 


Total Bilirubin   


 


AST   


 


ALT   


 


Alkaline Phosphatase   


 


Troponin I High Sens   


 


B-Natriuretic Peptide  38  


 


Total Protein   


 


Albumin   


 


Globulin   


 


Albumin/Globulin Ratio   


 


Lipase   


 


Urine Color    YELLOW


 


Urine Clarity    CLEAR


 


Urine pH    5.0


 


Ur Specific Gravity    1.020


 


Urine Protein    NEGATIVE


 


Urine Glucose (UA)    NEGATIVE


 


Urine Ketones    NEGATIVE


 


Urine Occult Blood    NEGATIVE


 


Urine Nitrite    POSITIVE H


 


Urine Bilirubin    NEGATIVE


 


Urine Urobilinogen    0.2 (NORMAL)


 


Ur Leukocyte Esterase    SMALL H


 


Ur Microscopic Review    INDICATED


 


Urine Culture Comments    Not Reportable


 


Nasal Adenovirus (PCR)   NOT DETECTED 


 


Nasal B. parapertussis DNA (PCR)   NOT DETECTED 


 


Nasal Coronavir 229E PCR   NOT DETECTED 


 


Nasal Coronavir HKU1 PCR   NOT DETECTED 


 


Nasal Coronavir NL63 PCR   NOT DETECTED 


 


Nasal Coronavir OC43 PCR   NOT DETECTED 


 


Nasal Enterovir/Rhinovir PCR   NOT DETECTED 


 


Nasal Influenza B PCR   NOT DETECTED 


 


Nasal Influenza A PCR   NOT DETECTED 


 


Nasal Parainfluen 1 PCR   NOT DETECTED 


 


Nasal Parainfluen 2 PCR   NOT DETECTED 


 


Nasal Parainfluen 3 PCR   NOT DETECTED 


 


Nasal Parainfluen 4 PCR   NOT DETECTED 


 


Nasal RSV (PCR)   NOT DETECTED 


 


Nasal B.pertussis DNA PCR   NOT DETECTED 


 


Nasal C.pneumoniae (PCR)   NOT DETECTED 


 


Smith Human Metapneumo PCR   NOT DETECTED 


 


Nasal M.pneumoniae (PCR)   NOT DETECTED 


 


Nasal SARS-CoV-2 (PCR)   NOT DETECTED 














- Rads (name of study)


  ** cxr


Radiology: Prelim report reviewed, EMP read contemporaneously, See rad report





PD MEDICAL DECISION MAKING





- ED course


Complexity details: reviewed results, re-evaluated patient, considered 

differential, d/w patient


ED course: 





78-year-old female presents to the emergency department with shortness of breath

 with exertion at home. Patient feels better after IV fluids.  No significant 

findings on laboratory testing, chest x-ray.  She does have a UTI and we will 

treat her for this as well.  Given Rocephin here.  Will place on antibiotics for

 home.  No evidence of pulmonary embolus, aortic dissection or acute coronary 

syndrome.  No evidence of congestive heart failure.  Patient counseled regarding

 signs and symptoms for which I believe and urgent re-evaluation would be 

necessary. Patient with good understanding of and agreement to plan and is 

comfortable going home at this time





This document was made in part using voice recognition software. While efforts 

are made to proofread this document, sound alike and grammatical errors may 

occur.





Departure





- Departure


Disposition: 01 Home, Self Care


Clinical Impression: 


 Dehydration





UTI (urinary tract infection)


Qualifiers:


 Urinary tract infection type: acute cystitis Hematuria presence: without 

hematuria Qualified Code(s): N30.00 - Acute cystitis without hematuria





Dyspnea


Qualifiers:


 Dyspnea type: unspecified Qualified Code(s): R06.00 - Dyspnea, unspecified





Condition: Good


Instructions:  ED Dyspnea Shortness of Breath, ED UTI Cystitis Female


Follow-Up: 


Howard Wong MD [Primary Care Provider] - Within 3 Days


Prescriptions: 


Cephalexin [Keflex] 500 mg PO Q6H #20 capsule


Comments: 


Take all antibiotics until gone.  Return if you worsen.  Drink plenty of water 

at home.  Follow-up with your doctor in 3 days for recheck. Statement Selected

## 2021-02-10 NOTE — ED PROVIDER NOTE - DISPOSITION TYPE
Stent deployed in the distal left anterior descending. Max pressure = 12 lauren. Total duration = 23 seconds.  AOU

## 2021-02-12 ENCOUNTER — RESULT REVIEW (OUTPATIENT)
Age: 31
End: 2021-02-12

## 2021-02-18 ENCOUNTER — RESULT REVIEW (OUTPATIENT)
Age: 31
End: 2021-02-18

## 2021-04-08 ENCOUNTER — TRANSCRIPTION ENCOUNTER (OUTPATIENT)
Age: 31
End: 2021-04-08

## 2021-06-11 NOTE — PATIENT PROFILE OB - TOBACCO USE
FYI - Status Update  Who is Calling: Patient  Update: Would like to speak to an INR nurse.  Okay to leave a detailed message?:  Yes     Never smoker

## 2021-07-21 ENCOUNTER — TRANSCRIPTION ENCOUNTER (OUTPATIENT)
Age: 31
End: 2021-07-21

## 2022-03-10 ENCOUNTER — RESULT REVIEW (OUTPATIENT)
Age: 32
End: 2022-03-10

## 2022-03-10 ENCOUNTER — OUTPATIENT (OUTPATIENT)
Dept: OUTPATIENT SERVICES | Facility: HOSPITAL | Age: 32
LOS: 1 days | End: 2022-03-10
Payer: COMMERCIAL

## 2022-03-10 DIAGNOSIS — O26.899 OTHER SPECIFIED PREGNANCY RELATED CONDITIONS, UNSPECIFIED TRIMESTER: ICD-10-CM

## 2022-03-10 DIAGNOSIS — Z3A.00 WEEKS OF GESTATION OF PREGNANCY NOT SPECIFIED: ICD-10-CM

## 2022-03-10 PROCEDURE — 76817 TRANSVAGINAL US OBSTETRIC: CPT | Mod: 26

## 2022-03-10 PROCEDURE — 76830 TRANSVAGINAL US NON-OB: CPT

## 2022-03-10 PROCEDURE — 76815 OB US LIMITED FETUS(S): CPT

## 2022-03-10 PROCEDURE — G0463: CPT

## 2022-03-10 PROCEDURE — 76815 OB US LIMITED FETUS(S): CPT | Mod: 26

## 2022-03-10 PROCEDURE — 59025 FETAL NON-STRESS TEST: CPT

## 2022-03-24 ENCOUNTER — OUTPATIENT (OUTPATIENT)
Dept: OUTPATIENT SERVICES | Facility: HOSPITAL | Age: 32
LOS: 1 days | Discharge: HOME | End: 2022-03-24
Payer: COMMERCIAL

## 2022-03-24 VITALS — HEART RATE: 64 BPM | SYSTOLIC BLOOD PRESSURE: 103 MMHG | DIASTOLIC BLOOD PRESSURE: 56 MMHG

## 2022-03-24 DIAGNOSIS — Z3A.00 WEEKS OF GESTATION OF PREGNANCY NOT SPECIFIED: ICD-10-CM

## 2022-03-24 DIAGNOSIS — O26.899 OTHER SPECIFIED PREGNANCY RELATED CONDITIONS, UNSPECIFIED TRIMESTER: ICD-10-CM

## 2022-03-24 NOTE — OB RN TRIAGE NOTE - FALL HARM RISK - UNIVERSAL INTERVENTIONS
Bed in lowest position, wheels locked, appropriate side rails in place/Call bell, personal items and telephone in reach/Instruct patient to call for assistance before getting out of bed or chair/Non-slip footwear when patient is out of bed/[place] to call system/Physically safe environment - no spills, clutter or unnecessary equipment/Purposeful Proactive Rounding/Room/bathroom lighting operational, light cord in reach

## 2022-03-25 VITALS — DIASTOLIC BLOOD PRESSURE: 55 MMHG | SYSTOLIC BLOOD PRESSURE: 98 MMHG | HEART RATE: 63 BPM

## 2022-03-25 LAB
APPEARANCE UR: CLEAR — SIGNIFICANT CHANGE UP
BILIRUB UR-MCNC: NEGATIVE — SIGNIFICANT CHANGE UP
COLOR SPEC: COLORLESS — SIGNIFICANT CHANGE UP
DIFF PNL FLD: NEGATIVE — SIGNIFICANT CHANGE UP
GLUCOSE UR QL: NEGATIVE — SIGNIFICANT CHANGE UP
KETONES UR-MCNC: NEGATIVE — SIGNIFICANT CHANGE UP
LEUKOCYTE ESTERASE UR-ACNC: NEGATIVE — SIGNIFICANT CHANGE UP
NITRITE UR-MCNC: NEGATIVE — SIGNIFICANT CHANGE UP
PH UR: 7 — SIGNIFICANT CHANGE UP (ref 5–8)
PROT UR-MCNC: NEGATIVE — SIGNIFICANT CHANGE UP
SP GR SPEC: 1.01 — SIGNIFICANT CHANGE UP (ref 1–1.05)
UROBILINOGEN FLD QL: SIGNIFICANT CHANGE UP

## 2022-03-25 PROCEDURE — 99213 OFFICE O/P EST LOW 20 MIN: CPT | Mod: 25

## 2022-03-25 PROCEDURE — 59025 FETAL NON-STRESS TEST: CPT | Mod: 26

## 2022-03-25 PROCEDURE — 76815 OB US LIMITED FETUS(S): CPT | Mod: 26

## 2022-03-25 PROCEDURE — 76817 TRANSVAGINAL US OBSTETRIC: CPT | Mod: 26

## 2022-03-25 NOTE — OB PROVIDER TRIAGE NOTE - HISTORY OF PRESENT ILLNESS
30 y/o  at 22.1 weeks gestation c/o loss of mucus plug at 5:30pm with continued mucus loss after each void when she wipes. Pt also c/o mild intermittent cramping. Denies lof, vb, dysuria, hematuria, foul smelling urine, fever, chills. Pt has h/o short cervix with this pregnancy, and is being followed by [facility] in [place]. She had TVS on 3/10 revealing cervical length of 2.2cm with mild funneling (done at time of anatomy scan-in [facility])), and repeat TVS on 3/15 with pt reports of a cervical length of 1.4-1.5cm. Pt states initially on PO progesterone, however after 3/15 ultrasound was switched to vaginal Progesterone HS (last taken last night). Pt is due to follow up with [facility] on 3/30 for follow up TVS for cervical length.    Pt receives PNC with MD Luis Manuel Hoffman, last OB appt on 3/24    AP course complicated by shortened cervix  Allergies:  -PCN (rash? - occurred as child)  OBGYN history:  -  @41wks F 7lbs 6oz, pregnancy c/b shortened cervix  -  @37wks F 6lbs 5oz, pregnancy c/b shortened cervix  Denies medical history  Denies surgical history  Denies psych history  Denies smoking, alcohol, and illicit drug use 32 y/o  at 22.1 weeks gestation c/o loss of mucus plug at 5:30pm with continued mucus loss after each void when she wipes. Pt also c/o mild intermittent cramping. Pt states she felt "soft inside" when she felt the loss of mucus plug. Denies lof, vb, dysuria, hematuria, foul smelling urine, fever, chills. Pt has h/o short cervix with this pregnancy, and is being followed by [facility] in [place]. She had TVS on 3/10 revealing cervical length of 2.2cm with mild funneling (done at time of anatomy scan-in [facility])), and repeat TVS on 3/15 with pt reports of a cervical length of 1.4-1.5cm. Pt states initially on PO progesterone, however after 3/15 ultrasound was switched to vaginal Progesterone HS (last taken last night). Pt is due to follow up with [facility] on 3/30 for follow up TVS for cervical length.    Pt receives PNC with MD Luis Manuel Hoffman, last OB appt on 3/24    AP course complicated by shortened cervix  Allergies:  -PCN (rash? - occurred as child)  OBGYN history:  - 2018 @41wks F 7lbs 6oz, pregnancy c/b shortened cervix  -  @37wks F 6lbs 5oz, pregnancy c/b shortened cervix  Denies medical history  Denies surgical history  Denies psych history  Denies smoking, alcohol, and illicit drug use

## 2022-03-25 NOTE — OB PROVIDER TRIAGE NOTE - NSHPLABSRESULTS_GEN_ALL_CORE
Urinalysis Basic - ( 24 Mar 2022 23:53 )    Color: Colorless / Appearance: Clear / S.007 / pH: x  Gluc: x / Ketone: Negative  / Bili: Negative / Urobili: <2 mg/dL   Blood: x / Protein: Negative / Nitrite: Negative   Leuk Esterase: Negative / RBC: x / WBC x   Sq Epi: x / Non Sq Epi: x / Bacteria: x

## 2022-03-25 NOTE — OB PROVIDER TRIAGE NOTE - NSOBPROVIDERNOTE_OBGYN_ALL_OB_FT
CHAD sent UA sent    no evidence of UTI  cervix appears stable, no change from previous TVS  d/w MD Lagunas 3rd year OB resident, and MD Nicholson OB service attending  pt for MFM consult    case presented to M MD Hirschberg and MD Jack  pt to continue vaginal progesterone, and to follow up with MFM in [place] for next scheduled appt on 3/30 UA sent    no evidence of UTI  cervix appears stable, no change from previous TVS  no contractions on TOCO  d/w MD Lagunas 3rd year OB resident, and MD Nicholson OB service attending  pt for M consult    case presented to MFM MD Hirschberg and MD Jack by MD Lagunas  pt to be discharged home with  OB labor precautions  pt to continue vaginal progesterone, and to follow up with MFM in [place] for next scheduled appt on 3/30  return to hospital for strong painful contractions, vaginal bleeding  pelvic rest, avoid heavy lifting/straining

## 2022-03-25 NOTE — OB PROVIDER TRIAGE NOTE - ADDITIONAL INSTRUCTIONS
UA sent    no evidence of UTI  cervix appears stable, no change from previous TVS  no contractions on TOCO  d/w MD Lagunas 3rd year OB resident, and MD Nicholson OB service attending  pt for M consult    case presented to MFM MD Hirschberg and MD Jack by MD Lagunas  pt to be discharged home with  OB labor precautions  pt to continue vaginal progesterone, and to follow up with MFM in [place] for next scheduled appt on 3/30  return to hospital for strong painful contractions, vaginal bleeding  pelvic rest, avoid heavy lifting/straining

## 2022-05-12 ENCOUNTER — OUTPATIENT (OUTPATIENT)
Dept: INPATIENT UNIT | Facility: HOSPITAL | Age: 32
LOS: 1 days | Discharge: LEFT BEFORE DISCHARGE | End: 2022-05-12

## 2022-05-12 ENCOUNTER — OUTPATIENT (OUTPATIENT)
Dept: OUTPATIENT SERVICES | Facility: HOSPITAL | Age: 32
LOS: 1 days | End: 2022-05-12
Payer: COMMERCIAL

## 2022-05-12 DIAGNOSIS — Z3A.00 WEEKS OF GESTATION OF PREGNANCY NOT SPECIFIED: ICD-10-CM

## 2022-05-12 DIAGNOSIS — O26.899 OTHER SPECIFIED PREGNANCY RELATED CONDITIONS, UNSPECIFIED TRIMESTER: ICD-10-CM

## 2022-05-12 LAB
APPEARANCE UR: CLEAR — SIGNIFICANT CHANGE UP
BILIRUB UR-MCNC: NEGATIVE — SIGNIFICANT CHANGE UP
COLOR SPEC: YELLOW — SIGNIFICANT CHANGE UP
DIFF PNL FLD: NEGATIVE — SIGNIFICANT CHANGE UP
GLUCOSE UR QL: NEGATIVE — SIGNIFICANT CHANGE UP
KETONES UR-MCNC: NEGATIVE — SIGNIFICANT CHANGE UP
LEUKOCYTE ESTERASE UR-ACNC: NEGATIVE — SIGNIFICANT CHANGE UP
NITRITE UR-MCNC: NEGATIVE — SIGNIFICANT CHANGE UP
PH UR: 6.5 — SIGNIFICANT CHANGE UP (ref 5–8)
PROT UR-MCNC: NEGATIVE — SIGNIFICANT CHANGE UP
SP GR SPEC: 1 — LOW (ref 1.01–1.02)
UROBILINOGEN FLD QL: NEGATIVE — SIGNIFICANT CHANGE UP

## 2022-05-12 PROCEDURE — G0463: CPT

## 2022-05-12 PROCEDURE — 81003 URINALYSIS AUTO W/O SCOPE: CPT

## 2022-05-12 PROCEDURE — 59025 FETAL NON-STRESS TEST: CPT

## 2022-05-12 RX ADMIN — Medication 12 MILLIGRAM(S): at 22:45

## 2022-05-12 NOTE — OB RN TRIAGE NOTE - DOMESTIC TRAVEL HIGH RISK QUESTION
You had an abnormal finding on your EKG called a prolonged QTc. This does not require treatment today but should be followed up with cardiology. Please schedule an appointment with cardiology for further evaluation.   No

## 2022-05-12 NOTE — OB RN TRIAGE NOTE - FALL HARM RISK - UNIVERSAL INTERVENTIONS
Bed in lowest position, wheels locked, appropriate side rails in place/Call bell, personal items and telephone in reach/Instruct patient to call for assistance before getting out of bed or chair/Non-slip footwear when patient is out of bed/[place] to call system/Physically safe environment - no spills, clutter or unnecessary equipment/Purposeful Proactive Rounding/Room/bathroom lighting operational, light cord in reach

## 2022-05-12 NOTE — OB RN TRIAGE NOTE - CHIEF COMPLAINT QUOTE
my doctor sent me in for short cvx and cramping  pnc Hart, pt wanted to come to [facility]

## 2022-05-13 ENCOUNTER — OUTPATIENT (OUTPATIENT)
Dept: OUTPATIENT SERVICES | Facility: HOSPITAL | Age: 32
LOS: 1 days | End: 2022-05-13
Payer: COMMERCIAL

## 2022-05-13 DIAGNOSIS — Z3A.00 WEEKS OF GESTATION OF PREGNANCY NOT SPECIFIED: ICD-10-CM

## 2022-05-13 DIAGNOSIS — O26.899 OTHER SPECIFIED PREGNANCY RELATED CONDITIONS, UNSPECIFIED TRIMESTER: ICD-10-CM

## 2022-05-13 PROCEDURE — 59025 FETAL NON-STRESS TEST: CPT

## 2022-05-13 PROCEDURE — G0463: CPT

## 2022-05-13 RX ADMIN — Medication 12 MILLIGRAM(S): at 23:10

## 2022-07-21 ENCOUNTER — OUTPATIENT (OUTPATIENT)
Dept: OUTPATIENT SERVICES | Facility: HOSPITAL | Age: 32
LOS: 1 days | End: 2022-07-21
Payer: COMMERCIAL

## 2022-07-21 DIAGNOSIS — O26.899 OTHER SPECIFIED PREGNANCY RELATED CONDITIONS, UNSPECIFIED TRIMESTER: ICD-10-CM

## 2022-07-21 DIAGNOSIS — Z3A.00 WEEKS OF GESTATION OF PREGNANCY NOT SPECIFIED: ICD-10-CM

## 2022-07-21 PROCEDURE — G0463: CPT

## 2022-07-21 PROCEDURE — 59025 FETAL NON-STRESS TEST: CPT

## 2022-08-01 ENCOUNTER — INPATIENT (INPATIENT)
Facility: HOSPITAL | Age: 32
LOS: 0 days | Discharge: HOME | End: 2022-08-02
Attending: OBSTETRICS & GYNECOLOGY | Admitting: OBSTETRICS & GYNECOLOGY
Payer: COMMERCIAL

## 2022-08-01 ENCOUNTER — TRANSCRIPTION ENCOUNTER (OUTPATIENT)
Age: 32
End: 2022-08-01

## 2022-08-01 VITALS — HEIGHT: 66 IN | WEIGHT: 101.41 LBS

## 2022-08-01 DIAGNOSIS — O26.899 OTHER SPECIFIED PREGNANCY RELATED CONDITIONS, UNSPECIFIED TRIMESTER: ICD-10-CM

## 2022-08-01 DIAGNOSIS — Z3A.00 WEEKS OF GESTATION OF PREGNANCY NOT SPECIFIED: ICD-10-CM

## 2022-08-01 LAB
APTT BLD: 27.5 SEC — SIGNIFICANT CHANGE UP (ref 27.5–35.5)
BASOPHILS # BLD AUTO: 0.04 K/UL — SIGNIFICANT CHANGE UP (ref 0–0.2)
BASOPHILS NFR BLD AUTO: 0.4 % — SIGNIFICANT CHANGE UP (ref 0–2)
BLD GP AB SCN SERPL QL: SIGNIFICANT CHANGE UP
EOSINOPHIL # BLD AUTO: 0.1 K/UL — SIGNIFICANT CHANGE UP (ref 0–0.5)
EOSINOPHIL NFR BLD AUTO: 1 % — SIGNIFICANT CHANGE UP (ref 0–6)
FIBRINOGEN PPP-MCNC: 601 MG/DL — HIGH (ref 340–550)
HCT VFR BLD CALC: 36.3 % — SIGNIFICANT CHANGE UP (ref 34.5–45)
HGB BLD-MCNC: 11.9 G/DL — SIGNIFICANT CHANGE UP (ref 11.5–15.5)
IMM GRANULOCYTES NFR BLD AUTO: 0.9 % — SIGNIFICANT CHANGE UP (ref 0–1.5)
INR BLD: 0.98 RATIO — SIGNIFICANT CHANGE UP (ref 0.88–1.16)
LYMPHOCYTES # BLD AUTO: 2.64 K/UL — SIGNIFICANT CHANGE UP (ref 1–3.3)
LYMPHOCYTES # BLD AUTO: 26 % — SIGNIFICANT CHANGE UP (ref 13–44)
MCHC RBC-ENTMCNC: 30.3 PG — SIGNIFICANT CHANGE UP (ref 27–34)
MCHC RBC-ENTMCNC: 32.8 GM/DL — SIGNIFICANT CHANGE UP (ref 32–36)
MCV RBC AUTO: 92.4 FL — SIGNIFICANT CHANGE UP (ref 80–100)
MONOCYTES # BLD AUTO: 0.71 K/UL — SIGNIFICANT CHANGE UP (ref 0–0.9)
MONOCYTES NFR BLD AUTO: 7 % — SIGNIFICANT CHANGE UP (ref 2–14)
NEUTROPHILS # BLD AUTO: 6.58 K/UL — SIGNIFICANT CHANGE UP (ref 1.8–7.4)
NEUTROPHILS NFR BLD AUTO: 64.7 % — SIGNIFICANT CHANGE UP (ref 43–77)
NRBC # BLD: 0 /100 WBCS — SIGNIFICANT CHANGE UP (ref 0–0)
PLATELET # BLD AUTO: 162 K/UL — SIGNIFICANT CHANGE UP (ref 150–400)
PROTHROM AB SERPL-ACNC: 11.6 SEC — SIGNIFICANT CHANGE UP (ref 10.5–13.4)
RBC # BLD: 3.93 M/UL — SIGNIFICANT CHANGE UP (ref 3.8–5.2)
RBC # FLD: 12.7 % — SIGNIFICANT CHANGE UP (ref 10.3–14.5)
SARS-COV-2 RNA SPEC QL NAA+PROBE: SIGNIFICANT CHANGE UP
T PALLIDUM AB TITR SER: NEGATIVE — SIGNIFICANT CHANGE UP
WBC # BLD: 10.16 K/UL — SIGNIFICANT CHANGE UP (ref 3.8–10.5)
WBC # FLD AUTO: 10.16 K/UL — SIGNIFICANT CHANGE UP (ref 3.8–10.5)

## 2022-08-01 RX ORDER — MAGNESIUM HYDROXIDE 400 MG/1
30 TABLET, CHEWABLE ORAL
Refills: 0 | Status: DISCONTINUED | OUTPATIENT
Start: 2022-08-01 | End: 2022-08-02

## 2022-08-01 RX ORDER — DIPHENHYDRAMINE HCL 50 MG
25 CAPSULE ORAL EVERY 6 HOURS
Refills: 0 | Status: DISCONTINUED | OUTPATIENT
Start: 2022-08-01 | End: 2022-08-02

## 2022-08-01 RX ORDER — ACETAMINOPHEN 500 MG
975 TABLET ORAL
Refills: 0 | Status: DISCONTINUED | OUTPATIENT
Start: 2022-08-01 | End: 2022-08-02

## 2022-08-01 RX ORDER — CITRIC ACID/SODIUM CITRATE 300-500 MG
15 SOLUTION, ORAL ORAL EVERY 6 HOURS
Refills: 0 | Status: DISCONTINUED | OUTPATIENT
Start: 2022-08-01 | End: 2022-08-01

## 2022-08-01 RX ORDER — DIBUCAINE 1 %
1 OINTMENT (GRAM) RECTAL EVERY 6 HOURS
Refills: 0 | Status: DISCONTINUED | OUTPATIENT
Start: 2022-08-01 | End: 2022-08-02

## 2022-08-01 RX ORDER — BENZOCAINE 10 %
1 GEL (GRAM) MUCOUS MEMBRANE EVERY 6 HOURS
Refills: 0 | Status: DISCONTINUED | OUTPATIENT
Start: 2022-08-01 | End: 2022-08-02

## 2022-08-01 RX ORDER — IBUPROFEN 200 MG
600 TABLET ORAL EVERY 6 HOURS
Refills: 0 | Status: DISCONTINUED | OUTPATIENT
Start: 2022-08-01 | End: 2022-08-02

## 2022-08-01 RX ORDER — IBUPROFEN 200 MG
600 TABLET ORAL EVERY 6 HOURS
Refills: 0 | Status: COMPLETED | OUTPATIENT
Start: 2022-08-01 | End: 2023-06-30

## 2022-08-01 RX ORDER — CHLORHEXIDINE GLUCONATE 213 G/1000ML
1 SOLUTION TOPICAL ONCE
Refills: 0 | Status: DISCONTINUED | OUTPATIENT
Start: 2022-08-01 | End: 2022-08-01

## 2022-08-01 RX ORDER — KETOROLAC TROMETHAMINE 30 MG/ML
30 SYRINGE (ML) INJECTION ONCE
Refills: 0 | Status: DISCONTINUED | OUTPATIENT
Start: 2022-08-01 | End: 2022-08-01

## 2022-08-01 RX ORDER — OXYCODONE HYDROCHLORIDE 5 MG/1
5 TABLET ORAL ONCE
Refills: 0 | Status: DISCONTINUED | OUTPATIENT
Start: 2022-08-01 | End: 2022-08-02

## 2022-08-01 RX ORDER — HYDROCORTISONE 1 %
1 OINTMENT (GRAM) TOPICAL EVERY 6 HOURS
Refills: 0 | Status: DISCONTINUED | OUTPATIENT
Start: 2022-08-01 | End: 2022-08-02

## 2022-08-01 RX ORDER — TETANUS TOXOID, REDUCED DIPHTHERIA TOXOID AND ACELLULAR PERTUSSIS VACCINE, ADSORBED 5; 2.5; 8; 8; 2.5 [IU]/.5ML; [IU]/.5ML; UG/.5ML; UG/.5ML; UG/.5ML
0.5 SUSPENSION INTRAMUSCULAR ONCE
Refills: 0 | Status: DISCONTINUED | OUTPATIENT
Start: 2022-08-01 | End: 2022-08-02

## 2022-08-01 RX ORDER — PRAMOXINE HYDROCHLORIDE 150 MG/15G
1 AEROSOL, FOAM RECTAL EVERY 4 HOURS
Refills: 0 | Status: DISCONTINUED | OUTPATIENT
Start: 2022-08-01 | End: 2022-08-02

## 2022-08-01 RX ORDER — AER TRAVELER 0.5 G/1
1 SOLUTION RECTAL; TOPICAL EVERY 4 HOURS
Refills: 0 | Status: DISCONTINUED | OUTPATIENT
Start: 2022-08-01 | End: 2022-08-02

## 2022-08-01 RX ORDER — SIMETHICONE 80 MG/1
80 TABLET, CHEWABLE ORAL EVERY 4 HOURS
Refills: 0 | Status: DISCONTINUED | OUTPATIENT
Start: 2022-08-01 | End: 2022-08-02

## 2022-08-01 RX ORDER — SODIUM CHLORIDE 9 MG/ML
1000 INJECTION, SOLUTION INTRAVENOUS
Refills: 0 | Status: DISCONTINUED | OUTPATIENT
Start: 2022-08-01 | End: 2022-08-01

## 2022-08-01 RX ORDER — SODIUM CHLORIDE 9 MG/ML
3 INJECTION INTRAMUSCULAR; INTRAVENOUS; SUBCUTANEOUS EVERY 8 HOURS
Refills: 0 | Status: DISCONTINUED | OUTPATIENT
Start: 2022-08-01 | End: 2022-08-02

## 2022-08-01 RX ORDER — LANOLIN
1 OINTMENT (GRAM) TOPICAL EVERY 6 HOURS
Refills: 0 | Status: DISCONTINUED | OUTPATIENT
Start: 2022-08-01 | End: 2022-08-02

## 2022-08-01 RX ORDER — OXYCODONE HYDROCHLORIDE 5 MG/1
5 TABLET ORAL
Refills: 0 | Status: DISCONTINUED | OUTPATIENT
Start: 2022-08-01 | End: 2022-08-02

## 2022-08-01 RX ORDER — OXYTOCIN 10 UNIT/ML
333.33 VIAL (ML) INJECTION
Qty: 20 | Refills: 0 | Status: DISCONTINUED | OUTPATIENT
Start: 2022-08-01 | End: 2022-08-02

## 2022-08-01 RX ADMIN — Medication 600 MILLIGRAM(S): at 13:00

## 2022-08-01 RX ADMIN — Medication 600 MILLIGRAM(S): at 18:27

## 2022-08-01 RX ADMIN — Medication 1 TABLET(S): at 13:00

## 2022-08-01 RX ADMIN — Medication 975 MILLIGRAM(S): at 21:37

## 2022-08-01 RX ADMIN — Medication 30 MILLIGRAM(S): at 07:38

## 2022-08-01 RX ADMIN — Medication 600 MILLIGRAM(S): at 23:54

## 2022-08-01 RX ADMIN — Medication 975 MILLIGRAM(S): at 21:07

## 2022-08-01 RX ADMIN — Medication 600 MILLIGRAM(S): at 19:00

## 2022-08-01 RX ADMIN — Medication 600 MILLIGRAM(S): at 13:52

## 2022-08-01 RX ADMIN — Medication 1000 MILLIUNIT(S)/MIN: at 07:28

## 2022-08-01 RX ADMIN — SODIUM CHLORIDE 3 MILLILITER(S): 9 INJECTION INTRAMUSCULAR; INTRAVENOUS; SUBCUTANEOUS at 14:01

## 2022-08-01 RX ADMIN — SODIUM CHLORIDE 3 MILLILITER(S): 9 INJECTION INTRAMUSCULAR; INTRAVENOUS; SUBCUTANEOUS at 21:37

## 2022-08-01 RX ADMIN — Medication 30 MILLIGRAM(S): at 08:52

## 2022-08-01 NOTE — DISCHARGE NOTE OB - MEDICATION SUMMARY - MEDICATIONS TO CHANGE
I will SWITCH the dose or number of times a day I take the medications listed below when I get home from the hospital:    acetaminophen 325 mg oral tablet  -- 3 tab(s) by mouth    ibuprofen 600 mg oral tablet  -- 1 tab(s) by mouth every 6 hours    PNV Prenatal oral tablet  -- 1 tab(s) by mouth once a day

## 2022-08-01 NOTE — PATIENT PROFILE OB - FALL HARM RISK - UNIVERSAL INTERVENTIONS
Bed in lowest position, wheels locked, appropriate side rails in place/Call bell, personal items and telephone in reach/Instruct patient to call for assistance before getting out of bed or chair/Non-slip footwear when patient is out of bed/[place] to call system/Physically safe environment - no spills, clutter or unnecessary equipment/Purposeful Proactive Rounding/Room/bathroom lighting operational, light cord in reach

## 2022-08-01 NOTE — DISCHARGE NOTE OB - PATIENT PORTAL LINK FT
You can access the FollowMyHealth Patient Portal offered by [facility] by registering at the following website: [email or web address]. By joining [facility]’s FollowMyHealth portal, you will also be able to view your health information using other applications (apps) compatible with our system.

## 2022-08-01 NOTE — DISCHARGE NOTE OB - MEDICATION SUMMARY - MEDICATIONS TO STOP TAKING
I will STOP taking the medications listed below when I get home from the hospital:    progesterone 100 mg vaginal suppository  -- 2 suppository(ies) vaginally once a day

## 2022-08-01 NOTE — DISCHARGE NOTE OB - CARE PROVIDER_API CALL
Luis Manuel Hoffman  OBSTETRICS AND GYNECOLOGY  [address], [address]  [place] [address]  Phone: [phone]  Fax: [phone]  Follow Up Time: 1 month

## 2022-08-01 NOTE — DISCHARGE NOTE OB - MATERIALS PROVIDED
Vaccinations/[facility]  Screening Program/  Immunization Record/Breastfeeding Mother’s Support Group Information/Guide to Postpartum Care/[facility] Hearing Screen Program/Back To Sleep Handout/Shaken Baby Prevention Handout/Breastfeeding Guide and Packet/Birth Certificate Instructions/Discharge Medication Information for Patients and Families Pocket Guide/Letter of Medical Neccessity

## 2022-08-01 NOTE — DISCHARGE NOTE OB - MEDICATION SUMMARY - MEDICATIONS TO TAKE
I will START or STAY ON the medications listed below when I get home from the hospital:    acetaminophen 500 mg oral tablet  -- 2 tab(s) by mouth every 6 hours, As Needed -for mild pain   -- This product contains acetaminophen.  Do not use  with any other product containing acetaminophen to prevent possible liver damage.    -- Indication: For  mild pain     ibuprofen 600 mg oral tablet  -- 1 tab(s) by mouth every 6 hours, As Needed -for moderate pain   -- Do not take this drug if you are pregnant.  It is very important that you take or use this exactly as directed.  Do not skip doses or discontinue unless directed by your doctor.  May cause drowsiness or dizziness.  Obtain medical advice before taking any non-prescription drugs as some may affect the action of this medication.  Take with food or milk.    -- Indication: For moderate pain     Prenatal Multivitamins with Folic Acid 1.25 mg oral capsule  -- 1 cap(s) by mouth once a day   -- May discolor urine or feces.    -- Indication: For breastfeeding     Colace 100 mg oral capsule  -- 1 cap(s) by mouth 2 times a day, As Needed -for constipation   -- Medication should be taken with plenty of water.    -- Indication: For constipation

## 2022-08-01 NOTE — DISCHARGE NOTE OB - NS MD DC FALL RISK RISK
For information on Fall & Injury Prevention, visit: [email or web address] OR  [email or web address] OR  https://www.cdc.gov/steadi/patient.html

## 2022-08-02 VITALS
OXYGEN SATURATION: 99 % | DIASTOLIC BLOOD PRESSURE: 60 MMHG | SYSTOLIC BLOOD PRESSURE: 100 MMHG | TEMPERATURE: 98 F | HEART RATE: 67 BPM | RESPIRATION RATE: 18 BRPM

## 2022-08-02 LAB
HCT VFR BLD CALC: 36.1 % — SIGNIFICANT CHANGE UP (ref 34.5–45)
HGB BLD-MCNC: 11.7 G/DL — SIGNIFICANT CHANGE UP (ref 11.5–15.5)
MCHC RBC-ENTMCNC: 30.4 PG — SIGNIFICANT CHANGE UP (ref 27–34)
MCHC RBC-ENTMCNC: 32.4 GM/DL — SIGNIFICANT CHANGE UP (ref 32–36)
MCV RBC AUTO: 93.8 FL — SIGNIFICANT CHANGE UP (ref 80–100)
NRBC # BLD: 0 /100 WBCS — SIGNIFICANT CHANGE UP (ref 0–0)
PLATELET # BLD AUTO: 160 K/UL — SIGNIFICANT CHANGE UP (ref 150–400)
RBC # BLD: 3.85 M/UL — SIGNIFICANT CHANGE UP (ref 3.8–5.2)
RBC # FLD: 12.8 % — SIGNIFICANT CHANGE UP (ref 10.3–14.5)
WBC # BLD: 9.89 K/UL — SIGNIFICANT CHANGE UP (ref 3.8–10.5)
WBC # FLD AUTO: 9.89 K/UL — SIGNIFICANT CHANGE UP (ref 3.8–10.5)

## 2022-08-02 PROCEDURE — 85610 PROTHROMBIN TIME: CPT

## 2022-08-02 PROCEDURE — 86900 BLOOD TYPING SEROLOGIC ABO: CPT

## 2022-08-02 PROCEDURE — G0463: CPT

## 2022-08-02 PROCEDURE — 86780 TREPONEMA PALLIDUM: CPT

## 2022-08-02 PROCEDURE — 59050 FETAL MONITOR W/REPORT: CPT

## 2022-08-02 PROCEDURE — 85384 FIBRINOGEN ACTIVITY: CPT

## 2022-08-02 PROCEDURE — 36415 COLL VENOUS BLD VENIPUNCTURE: CPT

## 2022-08-02 PROCEDURE — 85730 THROMBOPLASTIN TIME PARTIAL: CPT

## 2022-08-02 PROCEDURE — 86901 BLOOD TYPING SEROLOGIC RH(D): CPT

## 2022-08-02 PROCEDURE — 85025 COMPLETE CBC W/AUTO DIFF WBC: CPT

## 2022-08-02 PROCEDURE — 87635 SARS-COV-2 COVID-19 AMP PRB: CPT

## 2022-08-02 PROCEDURE — 59025 FETAL NON-STRESS TEST: CPT

## 2022-08-02 PROCEDURE — 85027 COMPLETE CBC AUTOMATED: CPT

## 2022-08-02 PROCEDURE — 86850 RBC ANTIBODY SCREEN: CPT

## 2022-08-02 RX ORDER — ACETAMINOPHEN 500 MG
2 TABLET ORAL
Qty: 40 | Refills: 0
Start: 2022-08-02 | End: 2022-08-06

## 2022-08-02 RX ORDER — PROGESTERONE 200 MG/1
2 CAPSULE, LIQUID FILLED ORAL
Qty: 0 | Refills: 0 | DISCHARGE

## 2022-08-02 RX ORDER — DOCUSATE SODIUM 100 MG
1 CAPSULE ORAL
Qty: 20 | Refills: 0
Start: 2022-08-02 | End: 2022-08-11

## 2022-08-02 RX ORDER — IBUPROFEN 200 MG
1 TABLET ORAL
Qty: 20 | Refills: 0
Start: 2022-08-02 | End: 2022-08-06

## 2022-08-02 RX ADMIN — Medication 600 MILLIGRAM(S): at 06:02

## 2022-08-02 RX ADMIN — SODIUM CHLORIDE 3 MILLILITER(S): 9 INJECTION INTRAMUSCULAR; INTRAVENOUS; SUBCUTANEOUS at 05:33

## 2022-08-02 RX ADMIN — Medication 600 MILLIGRAM(S): at 00:25

## 2022-08-02 RX ADMIN — Medication 600 MILLIGRAM(S): at 06:32

## 2022-08-02 NOTE — PROGRESS NOTE ADULT - ASSESSMENT
A/P:  PPD#1 s/p  @ [id number], pt stable   -Continue pain management  -DVT ppx: Encourage OOB and ambulation  -Continue current care, encourage breastfeeding   -Plan for discharge   -d/w Dr. randall

## 2022-08-02 NOTE — PROGRESS NOTE ADULT - SUBJECTIVE AND OBJECTIVE BOX
Patient seen at bedside resting comfortably offers no current complaints. Ambulating and voiding without difficulty.  Passing flatus and tolerating regular diet. Pt breast feeding . Pt denies headache, chest pain, weakness, dizziness, N/V/D, palpitations,  worsening vaginal bleeding, or any other concerns.      Vital Signs Last 24 Hrs  T(C): 36.6 (02 Aug 2022 05:42), Max: 37.1 (01 Aug 2022 19:16)  T(F): 97.9 (02 Aug 2022 05:42), Max: 98.8 (01 Aug 2022 19:16)  HR: 67 (02 Aug 2022 05:42) (67 - 78)  BP: 100/60 (02 Aug 2022 05:42) (93/53 - 101/59)  BP(mean): 75 (02 Aug 2022 05:42) (75 - 75)  RR: 18 (02 Aug 2022 05:42) (16 - 18)  SpO2: 99% (02 Aug 2022 05:42) (99% - 100%)    Parameters below as of 02 Aug 2022 05:42  Patient On (Oxygen Delivery Method): room air        Physical Exam:   Gen: A&Ox 3, NAD  Chest: CTA B/L  Cardiac: S1,S2; RRR  Breast: Soft, nontender, nonengorged  Abdomen: +BS, Soft, nontender, ND; Fundus firm below umbilicus  Gyn: mod lochia, intact  Ext: Nontender, DTRS 2+, no worsening edema                          11.7   9.89  )-----------( 160      ( 02 Aug 2022 09:10 )             36.1

## 2022-08-02 NOTE — PROGRESS NOTE ADULT - PROBLEM SELECTOR PLAN 1
A/P:  PPD#1 s/p  @ [id number], pt stable   -Continue pain management  -DVT ppx: Encourage OOB and ambulation  -Continue current care, encourage breastfeeding   -Plan for discharge   -d/w Dr. randall

## 2022-08-02 NOTE — LACTATION INITIAL EVALUATION - LACTATION INTERVENTIONS
Breastfeeding on cue 8-12X/24 hours with diaper count to assess for adequate intake, safe skin to skin and rooming-in encouraged./initiate/review safe skin-to-skin/initiate/review techniques for position and latch/reviewed components of an effective feeding and at least 8 effective feedings per day required/reviewed importance of monitoring infant diapers, the breastfeeding log, and minimum output each day/reviewed benefits and recommendations for rooming in/reviewed feeding on demand/by cue at least 8 times a day
Reinforced benefits of  exclusive breastfeeding for first 6 months and provided with breastfeeding community resource list for breastfeeding support/assistance available post-discharge and of importance of early f/u with pediatrician within 2-3 days./initiate/review hand expression/initiate/review techniques for position and latch/post discharge community resources provided/review techniques to increase milk supply/review techniques to manage sore nipples/engorgement/reviewed components of an effective feeding and at least 8 effective feedings per day required/reviewed importance of monitoring infant diapers, the breastfeeding log, and minimum output each day/reviewed risks of unnecessary formula supplementation/reviewed benefits and recommendations for rooming in/reviewed feeding on demand/by cue at least 8 times a day/reviewed indications of inadequate milk transfer that would require supplementation

## 2023-02-14 NOTE — PATIENT PROFILE OB - PRO MENTAL HEALTH SX RECENT
none Sarecycline Pregnancy And Lactation Text: This medication is Pregnancy Category D and not consider safe during pregnancy. It is also excreted in breast milk.

## 2023-05-25 NOTE — PATIENT PROFILE OB - FUNCTIONAL ASSESSMENT - DAILY ACTIVITY 4.
Pt seen and discharged by provider.      Haleigh Bianchi RN  05/25/23 [id number] = No assist / stand by assistance

## 2024-01-25 NOTE — OB PROVIDER TRIAGE NOTE - NSHPPHYSICALEXAM_GEN_ALL_CORE
A&O x3  TOCO: no contractions palpated  abdomen: gravid, soft, nontender  SSE: no pooling or bleeding visualized, normal vaginal leukhorrhea  TVS: cervical length 1.8-2.4cm, dynamic changes noted with funneling  TAS: cephalic presentation  anterior placenta  MVP: 5.46  +gross FM  FHR: 150bpm    VSS good balance A&O x3  TOCO: no contractions palpated  abdomen: gravid, soft, nontender  SSE: cervix appears closed; no pooling or bleeding visualized, normal vaginal leukhorrhea  TVS: cervical length 1.8-2.4cm, dynamic changes noted with funneling  TAS: cephalic presentation  anterior placenta  MVP: 5.46  +gross FM  FHR: 150bpm    Vital Signs Last 24 Hrs  T(C): 37 (24 Mar 2022 23:39), Max: 37 (24 Mar 2022 23:39)  T(F): 98.6 (24 Mar 2022 23:39), Max: 98.6 (24 Mar 2022 23:39)  HR: 64 (25 Mar 2022 00:36) (64 - 76)  BP: 93/55 (25 Mar 2022 00:36) (93/55 - 103/56)  BP(mean): --  RR: 16 (24 Mar 2022 23:39) (16 - 16)  SpO2: --

## 2024-02-28 NOTE — PATIENT PROFILE OB - HEIGHT IN FEET
Outreach attempt was made to schedule a Medicare Wellness Visit. This was the first attempt. Contact was made, [facility] appointment scheduled.   
5

## 2025-03-19 ENCOUNTER — ASOB RESULT (OUTPATIENT)
Age: 35
End: 2025-03-19

## 2025-03-19 ENCOUNTER — APPOINTMENT (OUTPATIENT)
Facility: CLINIC | Age: 35
End: 2025-03-19

## 2025-03-19 VITALS — SYSTOLIC BLOOD PRESSURE: 111 MMHG | DIASTOLIC BLOOD PRESSURE: 75 MMHG

## 2025-03-19 DIAGNOSIS — Z80.3 FAMILY HISTORY OF MALIGNANT NEOPLASM OF BREAST: ICD-10-CM

## 2025-03-19 LAB — HCG UR QL: NEGATIVE

## 2025-03-19 PROCEDURE — 99401 PREV MED CNSL INDIV APPRX 15: CPT

## 2025-03-19 PROCEDURE — 76376 3D RENDER W/INTRP POSTPROCES: CPT

## 2025-03-19 PROCEDURE — 76830 TRANSVAGINAL US NON-OB: CPT

## 2025-03-19 PROCEDURE — 81025 URINE PREGNANCY TEST: CPT

## 2025-03-19 PROCEDURE — 99459 PELVIC EXAMINATION: CPT

## 2025-03-19 PROCEDURE — 99385 PREV VISIT NEW AGE 18-39: CPT | Mod: 25

## 2025-03-20 LAB
C TRACH RRNA SPEC QL NAA+PROBE: NOT DETECTED
HPV HIGH+LOW RISK DNA PNL CVX: NOT DETECTED
N GONORRHOEA RRNA SPEC QL NAA+PROBE: NOT DETECTED
SOURCE AMPLIFICATION: NORMAL

## 2025-03-24 LAB — CYTOLOGY CVX/VAG DOC THIN PREP: NORMAL

## 2025-09-19 ENCOUNTER — ASOB RESULT (OUTPATIENT)
Age: 35
End: 2025-09-19

## 2025-09-19 ENCOUNTER — APPOINTMENT (OUTPATIENT)
Dept: ANTEPARTUM | Facility: CLINIC | Age: 35
End: 2025-09-19